# Patient Record
Sex: FEMALE | Race: WHITE | Employment: OTHER | URBAN - METROPOLITAN AREA
[De-identification: names, ages, dates, MRNs, and addresses within clinical notes are randomized per-mention and may not be internally consistent; named-entity substitution may affect disease eponyms.]

---

## 2017-05-15 ENCOUNTER — HOSPITAL ENCOUNTER (INPATIENT)
Age: 82
LOS: 2 days | Discharge: HOME | DRG: 244 | End: 2017-05-17
Attending: EMERGENCY MEDICINE | Admitting: INTERNAL MEDICINE
Payer: MEDICARE

## 2017-05-15 ENCOUNTER — OFFICE VISIT (OUTPATIENT)
Dept: FAMILY MEDICINE CLINIC | Age: 82
End: 2017-05-15

## 2017-05-15 VITALS
WEIGHT: 123 LBS | DIASTOLIC BLOOD PRESSURE: 64 MMHG | BODY MASS INDEX: 22.63 KG/M2 | HEIGHT: 62 IN | SYSTOLIC BLOOD PRESSURE: 168 MMHG | TEMPERATURE: 97.7 F | OXYGEN SATURATION: 98 % | RESPIRATION RATE: 16 BRPM | HEART RATE: 46 BPM

## 2017-05-15 DIAGNOSIS — I44.2 COMPLETE HEART BLOCK (HCC): Primary | ICD-10-CM

## 2017-05-15 DIAGNOSIS — I44.2 COMPLETE HEART BLOCK BY ELECTROCARDIOGRAM (HCC): ICD-10-CM

## 2017-05-15 DIAGNOSIS — R07.89 OTHER CHEST PAIN: Primary | ICD-10-CM

## 2017-05-15 DIAGNOSIS — I10 ESSENTIAL HYPERTENSION: ICD-10-CM

## 2017-05-15 PROBLEM — I44.30 ATRIOVENTRICULAR BLOCK: Status: ACTIVE | Noted: 2017-05-15

## 2017-05-15 LAB — TSH SERPL DL<=0.05 MIU/L-ACNC: 6.96 UIU/ML (ref 0.36–3.74)

## 2017-05-15 PROCEDURE — 36415 COLL VENOUS BLD VENIPUNCTURE: CPT | Performed by: INTERNAL MEDICINE

## 2017-05-15 PROCEDURE — 65660000000 HC RM CCU STEPDOWN

## 2017-05-15 PROCEDURE — 99283 EMERGENCY DEPT VISIT LOW MDM: CPT

## 2017-05-15 PROCEDURE — 84443 ASSAY THYROID STIM HORMONE: CPT | Performed by: INTERNAL MEDICINE

## 2017-05-15 PROCEDURE — 74011250637 HC RX REV CODE- 250/637: Performed by: EMERGENCY MEDICINE

## 2017-05-15 PROCEDURE — 74011250636 HC RX REV CODE- 250/636: Performed by: EMERGENCY MEDICINE

## 2017-05-15 PROCEDURE — 93005 ELECTROCARDIOGRAM TRACING: CPT

## 2017-05-15 PROCEDURE — 74011250637 HC RX REV CODE- 250/637: Performed by: INTERNAL MEDICINE

## 2017-05-15 RX ORDER — ONDANSETRON 2 MG/ML
4 INJECTION INTRAMUSCULAR; INTRAVENOUS
Status: DISCONTINUED | OUTPATIENT
Start: 2017-05-15 | End: 2017-05-17 | Stop reason: HOSPADM

## 2017-05-15 RX ORDER — AMLODIPINE BESYLATE 5 MG/1
2.5 TABLET ORAL DAILY
Status: DISCONTINUED | OUTPATIENT
Start: 2017-05-15 | End: 2017-05-15

## 2017-05-15 RX ORDER — HYDROCODONE BITARTRATE AND ACETAMINOPHEN 5; 325 MG/1; MG/1
1 TABLET ORAL
Status: DISCONTINUED | OUTPATIENT
Start: 2017-05-15 | End: 2017-05-17 | Stop reason: HOSPADM

## 2017-05-15 RX ORDER — ACETAMINOPHEN 325 MG/1
650 TABLET ORAL
Status: DISCONTINUED | OUTPATIENT
Start: 2017-05-15 | End: 2017-05-17 | Stop reason: HOSPADM

## 2017-05-15 RX ORDER — DOCUSATE SODIUM 100 MG/1
100 CAPSULE, LIQUID FILLED ORAL 2 TIMES DAILY
Status: DISCONTINUED | OUTPATIENT
Start: 2017-05-15 | End: 2017-05-17 | Stop reason: HOSPADM

## 2017-05-15 RX ORDER — HEPARIN SODIUM 5000 [USP'U]/ML
5000 INJECTION, SOLUTION INTRAVENOUS; SUBCUTANEOUS EVERY 8 HOURS
Status: DISCONTINUED | OUTPATIENT
Start: 2017-05-16 | End: 2017-05-17 | Stop reason: HOSPADM

## 2017-05-15 RX ORDER — HYDROCHLOROTHIAZIDE 25 MG/1
25 TABLET ORAL DAILY
Status: DISCONTINUED | OUTPATIENT
Start: 2017-05-16 | End: 2017-05-17 | Stop reason: HOSPADM

## 2017-05-15 RX ORDER — FERROUS SULFATE, DRIED 160(50) MG
1 TABLET, EXTENDED RELEASE ORAL DAILY
Status: DISCONTINUED | OUTPATIENT
Start: 2017-05-16 | End: 2017-05-17 | Stop reason: HOSPADM

## 2017-05-15 RX ORDER — SODIUM CHLORIDE 0.9 % (FLUSH) 0.9 %
5-10 SYRINGE (ML) INJECTION AS NEEDED
Status: DISCONTINUED | OUTPATIENT
Start: 2017-05-15 | End: 2017-05-17 | Stop reason: HOSPADM

## 2017-05-15 RX ORDER — NALOXONE HYDROCHLORIDE 0.4 MG/ML
0.4 INJECTION, SOLUTION INTRAMUSCULAR; INTRAVENOUS; SUBCUTANEOUS AS NEEDED
Status: DISCONTINUED | OUTPATIENT
Start: 2017-05-15 | End: 2017-05-17 | Stop reason: HOSPADM

## 2017-05-15 RX ORDER — MORPHINE SULFATE 4 MG/ML
4 INJECTION, SOLUTION INTRAMUSCULAR; INTRAVENOUS
Status: DISCONTINUED | OUTPATIENT
Start: 2017-05-15 | End: 2017-05-17 | Stop reason: HOSPADM

## 2017-05-15 RX ORDER — CITALOPRAM 20 MG/1
20 TABLET, FILM COATED ORAL DAILY
Status: DISCONTINUED | OUTPATIENT
Start: 2017-05-16 | End: 2017-05-17 | Stop reason: HOSPADM

## 2017-05-15 RX ORDER — AMLODIPINE BESYLATE 5 MG/1
5 TABLET ORAL DAILY
Status: DISCONTINUED | OUTPATIENT
Start: 2017-05-15 | End: 2017-05-16

## 2017-05-15 RX ORDER — DIAZEPAM 5 MG/1
2.5 TABLET ORAL
Status: COMPLETED | OUTPATIENT
Start: 2017-05-15 | End: 2017-05-15

## 2017-05-15 RX ORDER — SODIUM CHLORIDE 0.9 % (FLUSH) 0.9 %
5-10 SYRINGE (ML) INJECTION EVERY 8 HOURS
Status: DISCONTINUED | OUTPATIENT
Start: 2017-05-15 | End: 2017-05-17 | Stop reason: HOSPADM

## 2017-05-15 RX ORDER — LANOLIN ALCOHOL/MO/W.PET/CERES
500 CREAM (GRAM) TOPICAL DAILY
Status: DISCONTINUED | OUTPATIENT
Start: 2017-05-16 | End: 2017-05-17 | Stop reason: HOSPADM

## 2017-05-15 RX ADMIN — DOCUSATE SODIUM 100 MG: 100 CAPSULE, LIQUID FILLED ORAL at 21:52

## 2017-05-15 RX ADMIN — AMLODIPINE BESYLATE 5 MG: 5 TABLET ORAL at 21:52

## 2017-05-15 RX ADMIN — DIAZEPAM 2.5 MG: 5 TABLET ORAL at 23:06

## 2017-05-15 RX ADMIN — SODIUM CHLORIDE 500 ML: 900 INJECTION, SOLUTION INTRAVENOUS at 22:48

## 2017-05-15 RX ADMIN — Medication 10 ML: at 21:53

## 2017-05-15 NOTE — PROGRESS NOTES
HISTORY OF PRESENT ILLNESS  Zana Wheat is a 80 y.o. female. Chief Complaint   Patient presents with    Pain Upper Quadrant     Left, sharp, last nite, nitro (2) helped       HPI  Last night real sharp pain in left chest, then center and radiating to back  Very sharp  Lasted all night  Still now, but shorter, very quick now  Sitting talking or walking  Nothing help  Tried 2 ASA 81 mg but not helping  Then tried Nitro quick twice one at a time and it may have helped a little    -183/ 50-69  Pulse 40-42    Review of Systems   Constitutional: Negative for diaphoresis. Respiratory: Positive for sputum production (little). Cardiovascular: Positive for chest pain and leg swelling (right ankle). Negative for palpitations. Gastrointestinal: Positive for constipation. Negative for abdominal pain, blood in stool, diarrhea, heartburn, melena, nausea and vomiting. Genitourinary: Negative. Musculoskeletal: Negative for joint pain and myalgias. Neurological: Positive for headaches. Negative for dizziness and weakness. Past Medical History:   Diagnosis Date    Hypertension     MVP (mitral valve prolapse)      Current Outpatient Prescriptions   Medication Sig Dispense Refill    diltiazem (TIAZAC) 300 mg SR capsule Take 300 mg by mouth daily.  citalopram (CELEXA) 20 mg tablet Take  by mouth as needed.  calcium-cholecalciferol, D3, (CALTRATE 600+D) tablet Take 1 Tab by mouth daily.  cholecalciferol, vitamin D3, (VITAMIN D3) 2,000 unit tab Take  by mouth.  cyanocobalamin (VITAMIN B-12) 500 mcg tablet Take 500 mcg by mouth daily.  vitamin e (AQUA GEMS) 200 unit capsule Take  by mouth daily.  hydrochlorothiazide (HYDRODIURIL) 25 mg tablet Take 25 mg by mouth daily.        Allergies   Allergen Reactions    Verapamil Unknown (comments)     Visit Vitals    /64 (BP 1 Location: Left arm, BP Patient Position: Sitting)    Pulse (!) 46    Temp 97.7 °F (36.5 °C) (Temporal)    Resp 16    Ht 5' 2\" (1.575 m)    Wt 123 lb (55.8 kg)    SpO2 98%    BMI 22.5 kg/m2       Physical Exam   Constitutional: She is oriented to person, place, and time. She appears well-developed and well-nourished. No distress. HENT:   Head: Normocephalic and atraumatic. Right Ear: External ear normal.   Left Ear: External ear normal.   Nose: Nose normal.   Mouth/Throat: Oropharynx is clear and moist. No oropharyngeal exudate. Eyes: Conjunctivae and EOM are normal. Pupils are equal, round, and reactive to light. Neck: No thyromegaly present. Cardiovascular: Regular rhythm, normal heart sounds and intact distal pulses. bradycardia   Pulmonary/Chest: Effort normal and breath sounds normal.   Abdominal: Soft. She exhibits no distension and no mass. There is no tenderness. Musculoskeletal: She exhibits edema (trace bilat). Neurological: She is alert and oriented to person, place, and time. Skin: Skin is warm and dry. Psychiatric: She has a normal mood and affect. Nursing note and vitals reviewed. EKG with complete heart block    ASSESSMENT and PLAN    ICD-10-CM ICD-9-CM    1. Other chest pain R07.89 786.59 ECHO COMPLETE STUDY      AMB POC EKG ROUTINE W/ 12 LEADS, INTER & REP      CANCELED: CBC WITH AUTOMATED DIFF      CANCELED: XR CHEST PA LAT   2. Complete heart block by electrocardiogram (HCC) I44.2 426.0    3.  Essential hypertension I10 401.9 LIPID PANEL WITH LDL/HDL RATIO      IA HANDLG&/OR CONVEY OF SPEC FOR TR OFFICE TO LAB      IA COLLECTION VENOUS BLOOD,VENIPUNCTURE      CANCELED: METABOLIC PANEL, COMPREHENSIVE   discussed pt with Dr Radha Mooney  And pt sent to ER at [facility] for further work up and treatment, ER notified,  With  in private vehicle and stressed to go directly without delay

## 2017-05-15 NOTE — IP AVS SNAPSHOT
[address] [facility] 
[phone] Patient: Gracie Garner MRN: [id number] WQU:[id number] You are allergic to the following Allergen Reactions Verapamil Unknown (comments) Recent Documentation Height Weight BMI OB Status Smoking Status 1.6 m 56.6 kg 22.1 kg/m2 Postmenopausal Never Smoker Emergency Contacts Name Discharge Info Relation Home Work Mobile Cristian Casas  Spouse [3] [phone] About your hospitalization You were admitted on:  May 15, 2017 You last received care in the:  [facility] You were discharged on:  May 17, 2017 Unit phone number:  [phone] Why you were hospitalized Your primary diagnosis was:  Not on File Your diagnoses also included:  Atrioventricular Block Providers Seen During Your Hospitalizations Provider Role Specialty Primary office phone Pepe Giraldo DO Attending Provider Emergency Medicine [phone] Niecy Membreno MD Attending Provider Cardiology [phone] Your Primary Care Physician (PCP) Primary Care Physician Office Phone Office Fax [facility] [phone] Follow-up Information Follow up With Details Comments Contact Info Fredy Flores MD   [address] [address] 
[phone] Current Discharge Medication List  
  
START taking these medications Dose & Instructions Dispensing Information Comments Morning Noon Evening Bedtime  
 dilTIAZem  mg ER capsule Commonly known as:  CARDIZEM CD Replaces:  dilTIAZem 300 mg SR capsule Your last dose was: Your next dose is:    
   
   
 Dose:  300 mg Take 1 Cap by mouth daily. Quantity:  30 Cap Refills:  0 CONTINUE these medications which have NOT CHANGED Dose & Instructions Dispensing Information Comments Morning Noon Evening Bedtime  
 calcium-cholecalciferol (D3) tablet Commonly known as:  CALTRATE 600+D Your last dose was: Your next dose is:    
   
   
 Dose:  1 Tab Take 1 Tab by mouth daily. Refills:  0  
     
   
   
   
  
 citalopram 20 mg tablet Commonly known as:  Mertha Slim Your last dose was: Your next dose is: Take  by mouth as needed. Refills:  0  
     
   
   
   
  
 hydroCHLOROthiazide 25 mg tablet Commonly known as:  HYDRODIURIL Your last dose was: Your next dose is:    
   
   
 Dose:  25 mg Take 25 mg by mouth daily. Refills:  0  
     
   
   
   
  
 VITAMIN B-12 500 mcg tablet Generic drug:  cyanocobalamin Your last dose was: Your next dose is:    
   
   
 Dose:  500 mcg Take 500 mcg by mouth daily. Refills:  0  
     
   
   
   
  
 VITAMIN D3 2,000 unit Tab Generic drug:  cholecalciferol (vitamin D3) Your last dose was: Your next dose is: Take  by mouth. Refills:  0  
     
   
   
   
  
 vitamin e 200 unit capsule Commonly known as:  [address] Your last dose was: Your next dose is: Take  by mouth daily. Refills:  0 STOP taking these medications   
 dilTIAZem 300 mg SR capsule Commonly known as:  [facility] Replaced by:  dilTIAZem  mg ER capsule Where to Get Your Medications Information on where to get these meds will be given to you by the nurse or doctor. ! Ask your nurse or doctor about these medications  
  dilTIAZem  mg ER capsule Discharge Instructions DISCHARGE INSTRUCTIONS FOR PATIENTS WITH PACEMAKERS You had a permanent pacemaker inserted on 5/16 with Dr. Katia Ramirez due to a slow heart rate problem called complete heart block. The diltiazem was discontinued on admission, so it had to be re-prescribed on discharge. This is NOT a new medication for you. Take it indefinitely moving forward. Won't switch to another blood pressure medication since you seemed to tolerate this well and no longer concerned about slowing the heart rate with the pacemaker in place. 1. Remember to call for an appointment in 2-4 weeks [phone] to check healing and implant programming with Dr. Tamiko Reyes nurse. 2. Medic Alert Bracelets are available from your pharmacist to wear at all times if you choose to wear one. 3. Carry your ID card for pacemaker with you at all times. This card will be given to you in the hospital or mailed to you. 4. The pacemaker will bulge slightly under your skin. The bulge will decrease in size over the next few weeks. Please notify the doctor's office if you notice any of the following around your site: A.  A bruise that does not go away. B.  Soreness or yellow, green, or brown drainage from the site. C. Any swelling from the site. D. If you have a fever of 100 degrees or higher that lasts for a few days. INCISION CARE 1.  Leave skin glue over your site until it starts to fall off, usually in a few weeks. 2.  You may shower after 3 days as long as your incision isnt submerged or directly sprayed upon until well healed. 3.  For comfort, wear loose fitting clothing. 4.  Report any signs of infection, fever, pain, swelling, redness, oozing, or heat at site especially if these symptoms increase after the first 3 to 4 days. ACTIVITY PRECAUTIONS 1. Avoid rough contact with the implant site. 2. No driving for 14 days. 3. Avoid lifting your arm over your head, carrying anything on the affected side, or lifting over 10 pounds for 30 days. For the first 2 days only bend your arm at the elbow. 4. Any extreme activity such as golf, weight lifting or exercise biking should be restricted for 60 days. 5. Do not carry objects by holding them against your implant site. 6.  No shooting rifles or any type of gun with the affected shoulder permanently. SPECIAL PRECAUTIONS 1. You should avoid all strong magnetic fields, such as arc welding, large transformers, large motors. 2.  You may not have an MRI which uses a strong magnet to take pictures. 3.  Treatments or surgery that requires diathermy or electrocautery should be discussed with your doctor before scheduled. 4. Avoid radio frequency transmitters, including radar. 5. Advise dentist or other medical personnel you see that you have a pacemaker. 6.  Cell phones and microwave oven use is okay. 7.  If you plan to move or take a trip to a new area, the doctor's office will give you a name of a doctor to contact for any problems. ANTIBIOTIC THERAPY During the first 8 weeks after your pacemaker insertion, you may need antibiotics before any dental work or certain tests or operations. Let the dentist or doctor who is caring for you know that you have had an implanted device. Discharge Orders None [employer] Announcement We are excited to announce that we are making your provider's discharge notes available to you in [employer]. You will see these notes when they are completed and signed by the physician that discharged you from your recent hospital stay. If you have any questions or concerns about any information you see in [employer], please call the Health Information Department where you were seen or reach out to your Primary Care Provider for more information about your plan of care. Introducing [facility]! [employer] introduces [employer] patient portal. Now you can access parts of your medical record, email your doctor's office, and request medication refills online. 1. In your internet browser, go to [email or web address] 2. Click on the First Time User? Click Here link in the Sign In box.  You will see the New Member Sign Up page. 3. Enter your [employer] Access Code exactly as it appears below. You will not need to use this code after youve completed the sign-up process. If you do not sign up before the expiration date, you must request a new code. · [employer] Access Code: [email or web address] Expires: 8/15/2017  5:57 PM 
 
4. Enter the last four digits of your Social Security Number (xxxx) and Date of Birth (mm/dd/yyyy) as indicated and click Submit. You will be taken to the next sign-up page. 5. Create a [employer] ID. This will be your [employer] login ID and cannot be changed, so think of one that is secure and easy to remember. 6. Create a [employer] password. You can change your password at any time. 7. Enter your Password Reset Question and Answer. This can be used at a later time if you forget your password. 8. Enter your e-mail address. You will receive e-mail notification when new information is available in [address] 9. Click Sign Up. You can now view and download portions of your medical record. 10. Click the Download Summary menu link to download a portable copy of your medical information. If you have questions, please visit the Frequently Asked Questions section of the [employer] website. Remember, [employer] is NOT to be used for urgent needs. For medical emergencies, dial 911. Now available from your iPhone and Android! General Information Please provide this summary of care documentation to your next provider. Patient Signature:  ____________________________________________________________ Date:  ____________________________________________________________  
  
Maggie Cummings Provider Signature:  ____________________________________________________________ Date:  ____________________________________________________________

## 2017-05-15 NOTE — IP AVS SNAPSHOT
Current Discharge Medication List  
  
START taking these medications Dose & Instructions Dispensing Information Comments Morning Noon Evening Bedtime  
 dilTIAZem  mg ER capsule Commonly known as:  CARDIZEM CD Replaces:  dilTIAZem 300 mg SR capsule Your last dose was: Your next dose is:    
   
   
 Dose:  300 mg Take 1 Cap by mouth daily. Quantity:  30 Cap Refills:  0 CONTINUE these medications which have NOT CHANGED Dose & Instructions Dispensing Information Comments Morning Noon Evening Bedtime  
 calcium-cholecalciferol (D3) tablet Commonly known as:  CALTRATE 600+D Your last dose was: Your next dose is:    
   
   
 Dose:  1 Tab Take 1 Tab by mouth daily. Refills:  0  
     
   
   
   
  
 citalopram 20 mg tablet Commonly known as:  Sallye Force Your last dose was: Your next dose is: Take  by mouth as needed. Refills:  0  
     
   
   
   
  
 hydroCHLOROthiazide 25 mg tablet Commonly known as:  HYDRODIURIL Your last dose was: Your next dose is:    
   
   
 Dose:  25 mg Take 25 mg by mouth daily. Refills:  0  
     
   
   
   
  
 VITAMIN B-12 500 mcg tablet Generic drug:  cyanocobalamin Your last dose was: Your next dose is:    
   
   
 Dose:  500 mcg Take 500 mcg by mouth daily. Refills:  0  
     
   
   
   
  
 VITAMIN D3 2,000 unit Tab Generic drug:  cholecalciferol (vitamin D3) Your last dose was: Your next dose is: Take  by mouth. Refills:  0  
     
   
   
   
  
 vitamin e 200 unit capsule Commonly known as:  [address] Your last dose was: Your next dose is: Take  by mouth daily. Refills:  0 STOP taking these medications   
 dilTIAZem 300 mg SR capsule Commonly known as:  [facility] Replaced by:  dilTIAZem  mg ER capsule Where to Get Your Medications Information on where to get these meds will be given to you by the nurse or doctor. ! Ask your nurse or doctor about these medications  
  dilTIAZem  mg ER capsule

## 2017-05-16 ENCOUNTER — APPOINTMENT (OUTPATIENT)
Dept: GENERAL RADIOLOGY | Age: 82
DRG: 244 | End: 2017-05-16
Attending: INTERNAL MEDICINE
Payer: MEDICARE

## 2017-05-16 ENCOUNTER — TELEPHONE (OUTPATIENT)
Dept: ENDOCRINOLOGY | Age: 82
End: 2017-05-16

## 2017-05-16 LAB
ANION GAP BLD CALC-SCNC: 9 MMOL/L (ref 5–15)
ATRIAL RATE: 52 BPM
BASOPHILS # BLD AUTO: 0.1 K/UL (ref 0–0.1)
BASOPHILS # BLD: 1 % (ref 0–1)
BUN SERPL-MCNC: 17 MG/DL (ref 6–20)
BUN/CREAT SERPL: 19 (ref 12–20)
CALCIUM SERPL-MCNC: 9.1 MG/DL (ref 8.5–10.1)
CALCULATED P AXIS, ECG09: 77 DEGREES
CALCULATED R AXIS, ECG10: -35 DEGREES
CALCULATED T AXIS, ECG11: 57 DEGREES
CHLORIDE SERPL-SCNC: 108 MMOL/L (ref 97–108)
CO2 SERPL-SCNC: 25 MMOL/L (ref 21–32)
CREAT SERPL-MCNC: 0.89 MG/DL (ref 0.55–1.02)
DIAGNOSIS, 93000: NORMAL
EOSINOPHIL # BLD: 0.1 K/UL (ref 0–0.4)
EOSINOPHIL NFR BLD: 2 % (ref 0–7)
ERYTHROCYTE [DISTWIDTH] IN BLOOD BY AUTOMATED COUNT: 13.2 % (ref 11.5–14.5)
GLUCOSE SERPL-MCNC: 82 MG/DL (ref 65–100)
HCT VFR BLD AUTO: 40.1 % (ref 35–47)
HGB BLD-MCNC: 13.5 G/DL (ref 11.5–16)
LYMPHOCYTES # BLD AUTO: 44 % (ref 12–49)
LYMPHOCYTES # BLD: 3 K/UL (ref 0.8–3.5)
MCH RBC QN AUTO: 30.3 PG (ref 26–34)
MCHC RBC AUTO-ENTMCNC: 33.7 G/DL (ref 30–36.5)
MCV RBC AUTO: 90.1 FL (ref 80–99)
MONOCYTES # BLD: 0.7 K/UL (ref 0–1)
MONOCYTES NFR BLD AUTO: 10 % (ref 5–13)
NEUTS SEG # BLD: 3 K/UL (ref 1.8–8)
NEUTS SEG NFR BLD AUTO: 43 % (ref 32–75)
P-R INTERVAL, ECG05: 256 MS
PLATELET # BLD AUTO: 213 K/UL (ref 150–400)
POTASSIUM SERPL-SCNC: 3.6 MMOL/L (ref 3.5–5.1)
Q-T INTERVAL, ECG07: 494 MS
QRS DURATION, ECG06: 82 MS
QTC CALCULATION (BEZET), ECG08: 459 MS
RBC # BLD AUTO: 4.45 M/UL (ref 3.8–5.2)
SODIUM SERPL-SCNC: 142 MMOL/L (ref 136–145)
T4 FREE SERPL-MCNC: 1.2 NG/DL (ref 0.8–1.5)
VENTRICULAR RATE, ECG03: 52 BPM
WBC # BLD AUTO: 6.8 K/UL (ref 3.6–11)

## 2017-05-16 PROCEDURE — C1892 INTRO/SHEATH,FIXED,PEEL-AWAY: HCPCS

## 2017-05-16 PROCEDURE — 36415 COLL VENOUS BLD VENIPUNCTURE: CPT | Performed by: INTERNAL MEDICINE

## 2017-05-16 PROCEDURE — 93306 TTE W/DOPPLER COMPLETE: CPT

## 2017-05-16 PROCEDURE — 77030018836 HC SOL IRR NACL ICUM -A

## 2017-05-16 PROCEDURE — 85025 COMPLETE CBC W/AUTO DIFF WBC: CPT | Performed by: INTERNAL MEDICINE

## 2017-05-16 PROCEDURE — 80048 BASIC METABOLIC PNL TOTAL CA: CPT | Performed by: INTERNAL MEDICINE

## 2017-05-16 PROCEDURE — 71010 XR CHEST PORT: CPT

## 2017-05-16 PROCEDURE — C1785 PMKR, DUAL, RATE-RESP: HCPCS

## 2017-05-16 PROCEDURE — 77030011640 HC PAD GRND REM COVD -A

## 2017-05-16 PROCEDURE — 0JH606Z INSERTION OF PACEMAKER, DUAL CHAMBER INTO CHEST SUBCUTANEOUS TISSUE AND FASCIA, OPEN APPROACH: ICD-10-PCS | Performed by: INTERNAL MEDICINE

## 2017-05-16 PROCEDURE — 74011250636 HC RX REV CODE- 250/636: Performed by: INTERNAL MEDICINE

## 2017-05-16 PROCEDURE — 74011000250 HC RX REV CODE- 250

## 2017-05-16 PROCEDURE — 33208 INSRT HEART PM ATRIAL & VENT: CPT

## 2017-05-16 PROCEDURE — 02H63JZ INSERTION OF PACEMAKER LEAD INTO RIGHT ATRIUM, PERCUTANEOUS APPROACH: ICD-10-PCS | Performed by: INTERNAL MEDICINE

## 2017-05-16 PROCEDURE — 02HK3JZ INSERTION OF PACEMAKER LEAD INTO RIGHT VENTRICLE, PERCUTANEOUS APPROACH: ICD-10-PCS | Performed by: INTERNAL MEDICINE

## 2017-05-16 PROCEDURE — 77030031139 HC SUT VCRL2 J&J -A

## 2017-05-16 PROCEDURE — C1898 LEAD, PMKR, OTHER THAN TRANS: HCPCS

## 2017-05-16 PROCEDURE — 84439 ASSAY OF FREE THYROXINE: CPT | Performed by: INTERNAL MEDICINE

## 2017-05-16 PROCEDURE — L3670 SO ACRO/CLAV CAN WEB PRE OTS: HCPCS

## 2017-05-16 PROCEDURE — C1894 INTRO/SHEATH, NON-LASER: HCPCS

## 2017-05-16 PROCEDURE — 77030002996 HC SUT SLK J&J -A

## 2017-05-16 PROCEDURE — 74011250636 HC RX REV CODE- 250/636

## 2017-05-16 PROCEDURE — 77030018729 HC ELECTRD DEFIB PAD CARD -B

## 2017-05-16 PROCEDURE — 65660000000 HC RM CCU STEPDOWN

## 2017-05-16 RX ORDER — DILTIAZEM HYDROCHLORIDE 300 MG/1
300 CAPSULE, COATED, EXTENDED RELEASE ORAL DAILY
Qty: 30 CAP | Refills: 0 | Status: SHIPPED | OUTPATIENT
Start: 2017-05-16 | End: 2017-09-12 | Stop reason: SDUPTHER

## 2017-05-16 RX ORDER — HEPARIN SODIUM 200 [USP'U]/100ML
500 INJECTION, SOLUTION INTRAVENOUS ONCE
Status: COMPLETED | OUTPATIENT
Start: 2017-05-16 | End: 2017-05-16

## 2017-05-16 RX ORDER — SODIUM CHLORIDE 0.9 % (FLUSH) 0.9 %
5-10 SYRINGE (ML) INJECTION EVERY 8 HOURS
Status: DISCONTINUED | OUTPATIENT
Start: 2017-05-16 | End: 2017-05-17 | Stop reason: HOSPADM

## 2017-05-16 RX ORDER — LEVOTHYROXINE SODIUM 25 UG/1
25 TABLET ORAL
Qty: 30 TAB | Refills: 0 | Status: SHIPPED | OUTPATIENT
Start: 2017-05-16 | End: 2017-05-17

## 2017-05-16 RX ORDER — SODIUM CHLORIDE 900 MG/100ML
INJECTION INTRAVENOUS
Status: DISCONTINUED
Start: 2017-05-16 | End: 2017-05-16 | Stop reason: ALTCHOICE

## 2017-05-16 RX ORDER — SODIUM CHLORIDE 9 MG/ML
100 INJECTION, SOLUTION INTRAVENOUS CONTINUOUS
Status: DISCONTINUED | OUTPATIENT
Start: 2017-05-16 | End: 2017-05-16

## 2017-05-16 RX ORDER — LIDOCAINE HYDROCHLORIDE AND EPINEPHRINE 10; 10 MG/ML; UG/ML
INJECTION, SOLUTION INFILTRATION; PERINEURAL
Status: COMPLETED
Start: 2017-05-16 | End: 2017-05-16

## 2017-05-16 RX ORDER — VANCOMYCIN HYDROCHLORIDE 1 G/20ML
INJECTION, POWDER, LYOPHILIZED, FOR SOLUTION INTRAVENOUS
Status: COMPLETED
Start: 2017-05-16 | End: 2017-05-16

## 2017-05-16 RX ORDER — FENTANYL CITRATE 50 UG/ML
12.5-5 INJECTION, SOLUTION INTRAMUSCULAR; INTRAVENOUS
Status: DISCONTINUED | OUTPATIENT
Start: 2017-05-16 | End: 2017-05-16 | Stop reason: ALTCHOICE

## 2017-05-16 RX ORDER — BACITRACIN 50000 [IU]/1
50000 INJECTION, POWDER, FOR SOLUTION INTRAMUSCULAR ONCE
Status: COMPLETED | OUTPATIENT
Start: 2017-05-16 | End: 2017-05-16

## 2017-05-16 RX ORDER — MIDAZOLAM HYDROCHLORIDE 1 MG/ML
INJECTION, SOLUTION INTRAMUSCULAR; INTRAVENOUS
Status: COMPLETED
Start: 2017-05-16 | End: 2017-05-16

## 2017-05-16 RX ORDER — MIDAZOLAM HYDROCHLORIDE 1 MG/ML
1-5 INJECTION, SOLUTION INTRAMUSCULAR; INTRAVENOUS
Status: DISCONTINUED | OUTPATIENT
Start: 2017-05-16 | End: 2017-05-16 | Stop reason: ALTCHOICE

## 2017-05-16 RX ORDER — BACITRACIN 50000 [IU]/1
INJECTION, POWDER, FOR SOLUTION INTRAMUSCULAR
Status: COMPLETED
Start: 2017-05-16 | End: 2017-05-16

## 2017-05-16 RX ORDER — HEPARIN SODIUM 200 [USP'U]/100ML
INJECTION, SOLUTION INTRAVENOUS
Status: COMPLETED
Start: 2017-05-16 | End: 2017-05-16

## 2017-05-16 RX ORDER — LIDOCAINE HYDROCHLORIDE AND EPINEPHRINE 10; 10 MG/ML; UG/ML
1-20 INJECTION, SOLUTION INFILTRATION; PERINEURAL
Status: DISCONTINUED | OUTPATIENT
Start: 2017-05-16 | End: 2017-05-16 | Stop reason: ALTCHOICE

## 2017-05-16 RX ORDER — FENTANYL CITRATE 50 UG/ML
INJECTION, SOLUTION INTRAMUSCULAR; INTRAVENOUS
Status: COMPLETED
Start: 2017-05-16 | End: 2017-05-16

## 2017-05-16 RX ORDER — SODIUM CHLORIDE 0.9 % (FLUSH) 0.9 %
5-10 SYRINGE (ML) INJECTION AS NEEDED
Status: DISCONTINUED | OUTPATIENT
Start: 2017-05-16 | End: 2017-05-17 | Stop reason: HOSPADM

## 2017-05-16 RX ADMIN — Medication 10 ML: at 21:55

## 2017-05-16 RX ADMIN — MIDAZOLAM HYDROCHLORIDE 2 MG: 1 INJECTION, SOLUTION INTRAMUSCULAR; INTRAVENOUS at 18:30

## 2017-05-16 RX ADMIN — Medication 10 ML: at 14:39

## 2017-05-16 RX ADMIN — FENTANYL CITRATE 50 MCG: 50 INJECTION, SOLUTION INTRAMUSCULAR; INTRAVENOUS at 18:45

## 2017-05-16 RX ADMIN — MIDAZOLAM HYDROCHLORIDE 2 MG: 1 INJECTION, SOLUTION INTRAMUSCULAR; INTRAVENOUS at 18:15

## 2017-05-16 RX ADMIN — BACITRACIN 50000 UNITS: 5000 INJECTION, POWDER, FOR SOLUTION INTRAMUSCULAR at 19:01

## 2017-05-16 RX ADMIN — HEPARIN SODIUM 5000 UNITS: 5000 INJECTION, SOLUTION INTRAVENOUS; SUBCUTANEOUS at 06:33

## 2017-05-16 RX ADMIN — FENTANYL CITRATE 50 MCG: 50 INJECTION, SOLUTION INTRAMUSCULAR; INTRAVENOUS at 18:15

## 2017-05-16 RX ADMIN — LIDOCAINE HYDROCHLORIDE AND EPINEPHRINE 20 ML: 10; 10 INJECTION, SOLUTION INFILTRATION; PERINEURAL at 18:41

## 2017-05-16 RX ADMIN — BACITRACIN 50000 UNITS: 50000 INJECTION, POWDER, FOR SOLUTION INTRAMUSCULAR at 19:01

## 2017-05-16 RX ADMIN — Medication 10 ML: at 21:56

## 2017-05-16 RX ADMIN — LIDOCAINE HYDROCHLORIDE,EPINEPHRINE BITARTRATE 20 ML: 10; .01 INJECTION, SOLUTION INFILTRATION; PERINEURAL at 18:41

## 2017-05-16 RX ADMIN — VANCOMYCIN HYDROCHLORIDE 1000 MG: 1 INJECTION, POWDER, LYOPHILIZED, FOR SOLUTION INTRAVENOUS at 18:20

## 2017-05-16 RX ADMIN — HEPARIN SODIUM 5000 UNITS: 5000 INJECTION, SOLUTION INTRAVENOUS; SUBCUTANEOUS at 21:55

## 2017-05-16 RX ADMIN — HEPARIN SODIUM 1000 UNITS: 200 INJECTION, SOLUTION INTRAVENOUS at 18:15

## 2017-05-16 RX ADMIN — SODIUM CHLORIDE 100 ML/HR: 900 INJECTION, SOLUTION INTRAVENOUS at 18:00

## 2017-05-16 RX ADMIN — MIDAZOLAM HYDROCHLORIDE 1 MG: 1 INJECTION, SOLUTION INTRAMUSCULAR; INTRAVENOUS at 18:44

## 2017-05-16 RX ADMIN — MIDAZOLAM HYDROCHLORIDE 2 MG: 1 INJECTION INTRAMUSCULAR; INTRAVENOUS at 18:15

## 2017-05-16 RX ADMIN — Medication 10 ML: at 06:34

## 2017-05-16 RX ADMIN — Medication 10 ML: at 09:13

## 2017-05-16 NOTE — PROGRESS NOTES
Progress Note    NAME: Jesse Speaker   :  1935   MRN:  [id number]     Date/Time:  2017 9:17 PM    Patient PCP: Darline Hanley MD  ________________________________________________________________________     Assessment:     1. 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 She was in 2:1 AV block yesterday, so she actually has progressed in process of diltiazem washout. 2. History of paroxysmal supraventricular tachycardia, on diltiazem, previously on verapamil but didn't tolerate. 3. Hypertensive heart disease without heart failure. 4. Mitral valve prolapse. 5. Elevated TSH, likely low level hypothyroidism. 6. FULL CODE. Plan:     1. I discussed the potential benefits, risks (infection, bleeding, PTX, med reaction, etc), and alternatives to pacemaker implantation and she agrees to proceed. 2. Discontinued diltiazem (last dose 5/15) , avoid rate lowering medication. 3. Echo pending. 4. Will use norvasc in place of diltiazem. 5. Continue HCTZ, but hold this AM.  6. Consider starting thyroid supplement later in the admission. [x]        High complexity decision making was performed. Subjective:      Theron Blanco is a 80 y.o.  female who began having left sided chest pain (sharp) w/o radiation the night PTA while at rest.  She took two NTG without much relief (very old NTG) and then went to an OSH. She had no nausea or sweats. She was found to be hypertensive and in a 2:1 AVB. She was flown here by helicopter. TODAY:  She denies syncope, dizziness, palpitations, SOB at rest.  Her lest episode of sharp left-sided chest pain radiating to her back was yesterday.     Past Medical History:   Diagnosis Date    Hypertension     MVP (mitral valve prolapse)       Past Surgical History:   Procedure Laterality Date    HX COLONOSCOPY       Allergies   Allergen Reactions    Verapamil Unknown (comments)      Meds:  See below  Social History   Substance Use Topics    Smoking status: Never Smoker    Smokeless tobacco: Never Used    Alcohol use No      Family History   Problem Relation Age of Onset    No Known Problems Mother        REVIEW OF SYSTEMS:    Constitutional: negative fever, negative chills, negative weight loss  Respiratory:  negative cough, negative dyspnea  Cards:  negative for chest pain, palpitations, lower extremity edema  GI:   negative for nausea, vomiting, diarrhea, and abdominal pain  Genitourinary: negative for frequency, dysuria  Neurological:  negative for headaches, dizziness, vertigo, weakness    Objective:      Physical Exam:    Last 24hrs VS reviewed since prior progress note. Most recent are:    Visit Vitals    /48 (BP 1 Location: Left arm, BP Patient Position: At rest)    Pulse (!) 41    Temp 97.7 °F (36.5 °C)    Resp 16    Ht 5' 3\" (1.6 m)    Wt 56.6 kg (124 lb 12.5 oz)    SpO2 97%    BMI 22.1 kg/m2       Intake/Output Summary (Last 24 hours) at 05/16/17 0901  Last data filed at 05/16/17 0837   Gross per 24 hour   Intake                0 ml   Output              600 ml   Net             -600 ml        General Appearance: Well developed, well nourished, alert & oriented x 3,    no acute distress. Ears/Nose/Mouth/Throat: Pupils equal and round, Hearing grossly normal.  Neck:  Supple. JVP within normal limits. Carotids good upstrokes, with no bruit. Chest: Lungs clear to auscultation bilaterally. Symmetric air movement. Unlabored. Cardiovascular: Regular rate and rhythm, bradycardic, S1S2 normal, late systolic murmur +/- early click  Abdomen: Soft, non-tender, bowel sounds are active. No organomegaly. Extremities: No edema bilaterally. Distal Pulses +1. Skin: Warm and dry. Neuro: CN II-XII grossly intact, Strength and sensation grossly intact. []         Post-cath site without hematoma, bruit, tenderness, or thrill. Distal pulses intact.     Data:      Prior to Admission medications    Medication Sig Start Date End Date Taking? Authorizing Provider   citalopram (CELEXA) 20 mg tablet Take  by mouth as needed. Historical Provider   calcium-cholecalciferol, D3, (CALTRATE 600+D) tablet Take 1 Tab by mouth daily. Historical Provider   cholecalciferol, vitamin D3, (VITAMIN D3) 2,000 unit tab Take  by mouth. Historical Provider   cyanocobalamin (VITAMIN B-12) 500 mcg tablet Take 500 mcg by mouth daily. Historical Provider   vitamin e (AQUA GEMS) 200 unit capsule Take  by mouth daily. Historical Provider   hydrochlorothiazide (HYDRODIURIL) 25 mg tablet Take 25 mg by mouth daily. Historical Provider       Recent Results (from the past 24 hour(s))   EKG, 12 LEAD, INITIAL    Collection Time: 05/15/17  6:12 PM   Result Value Ref Range    Ventricular Rate 44 BPM    Atrial Rate 79 BPM    QRS Duration 78 ms    Q-T Interval 540 ms    QTC Calculation (Bezet) 461 ms    Calculated P Axis 76 degrees    Calculated R Axis -17 degrees    Calculated T Axis 33 degrees    Diagnosis       Sinus rhythm with complete heart block and Junctional bradycardia  Possible Anterior infarct , age undetermined  Abnormal ECG  No previous ECGs available  Confirmed by Marco Ferrell MD, --- ([id number]) on 5/16/2017 8:04:01 AM     CBC WITH AUTOMATED DIFF    Collection Time: 05/15/17  6:15 PM   Result Value Ref Range    WBC 6.8 3.6 - 11.0 K/uL    RBC 4.50 3.80 - 5.20 M/uL    HGB 13.3 11.5 - 16.0 g/dL    HCT 41.0 35.0 - 47.0 %    MCV 91.1 80.0 - 99.0 FL    MCH 29.6 26.0 - 34.0 PG    MCHC 32.4 30.0 - 36.5 g/dL    RDW 13.6 11.5 - 14.5 %    PLATELET 952 472 - 958 K/uL    NEUTROPHILS 44 32 - 75 %    LYMPHOCYTES 43 12 - 49 %    MONOCYTES 10 5 - 13 %    EOSINOPHILS 2 0 - 7 %    BASOPHILS 1 0 - 1 %    ABS. NEUTROPHILS 3.0 1.8 - 8.0 K/UL    ABS. LYMPHOCYTES 2.9 0.8 - 3.5 K/UL    ABS. MONOCYTES 0.7 0.0 - 1.0 K/UL    ABS. EOSINOPHILS 0.2 0.0 - 0.4 K/UL    ABS.  BASOPHILS 0.0 0.0 - 0.1 K/UL   TROPONIN I    Collection Time: 05/15/17  6:15 PM   Result Value Ref Range    Troponin-I, Qt. <0.04 <1.09 ng/mL   METABOLIC PANEL, COMPREHENSIVE    Collection Time: 05/15/17  6:15 PM   Result Value Ref Range    Sodium 143 136 - 145 mmol/L    Potassium 4.3 3.5 - 5.1 mmol/L    Chloride 103 97 - 108 mmol/L    CO2 26 21 - 32 mmol/L    Anion gap 14 5 - 15 mmol/L    Glucose 96 65 - 100 mg/dL    BUN 25 (H) 7.0 - 18.0 MG/DL    Creatinine 1.00 0.55 - 1.02 MG/DL    BUN/Creatinine ratio 25 (H) 12 - 20      GFR est AA >60 >60 ml/min/1.73m2    GFR est non-AA 53 (L) >60 ml/min/1.73m2    Calcium 9.4 8.5 - 10.1 MG/DL    Bilirubin, total 0.4 0.2 - 1.0 MG/DL    ALT (SGPT) 34 14 - 63 U/L    AST (SGOT) 43 (H) 15 - 37 U/L    Alk.  phosphatase 87 45 - 117 U/L    Protein, total 7.7 6.4 - 8.2 g/dL    Albumin 4.1 3.5 - 5.0 g/dL    Globulin 3.6 2.0 - 4.0 g/dL    A-G Ratio 1.1 1.1 - 2.2     CK W/ CKMB & INDEX    Collection Time: 05/15/17  6:15 PM   Result Value Ref Range    CK 70 26 - 192 U/L    CK - MB <1.0 <3.6 NG/ML    CK-MB Index Cannot be calulated 0 - 2.5     MAGNESIUM    Collection Time: 05/15/17  6:15 PM   Result Value Ref Range    Magnesium 2.4 1.6 - 2.4 mg/dL   PROTHROMBIN TIME + INR    Collection Time: 05/15/17  6:15 PM   Result Value Ref Range    INR 0.9 0.9 - 1.1      Prothrombin time 10.9 10.0 - 13.0 sec   URINALYSIS W/ RFLX MICROSCOPIC    Collection Time: 05/15/17  6:15 PM   Result Value Ref Range    Color YELLOW/STRAW      Appearance CLEAR CLEAR      Specific gravity 1.020 1.003 - 1.030      pH (UA) 7.5 5.0 - 8.0      Protein NEGATIVE  NEG mg/dL    Glucose NEGATIVE  NEG mg/dL    Ketone NEGATIVE  NEG mg/dL    Bilirubin NEGATIVE  NEG      Blood NEGATIVE  NEG      Urobilinogen 0.2 0.2 - 1.0 EU/dL    Nitrites NEGATIVE  NEG      Leukocyte Esterase SMALL (A) NEG     URINE MICROSCOPIC ONLY    Collection Time: 05/15/17  6:15 PM   Result Value Ref Range    WBC 5-10 0 - 4 /hpf    RBC 0-5 0 - 5 /hpf    Epithelial cells FEW FEW /lpf    Bacteria NEGATIVE  NEG /hpf   TSH 3RD GENERATION    Collection Time: 05/15/17  7:15 PM   Result Value Ref Range    TSH 5.89 (H) 0.34 - 4.82 uIU/mL   EKG, 12 LEAD, SUBSEQUENT    Collection Time: 05/15/17  7:31 PM   Result Value Ref Range    Ventricular Rate 42 BPM    Atrial Rate 42 BPM    P-R Interval 260 ms    QRS Duration 84 ms    Q-T Interval 606 ms    QTC Calculation (Bezet) 506 ms    Calculated P Axis 75 degrees    Calculated R Axis -46 degrees    Calculated T Axis 50 degrees    Diagnosis       Marked sinus bradycardia with 1st degree AV block  with 2nd degree AV block (Mobitz II)  Left axis deviation  Abnormal ECG  When compared with ECG of 15-MAY-2017 18:12,  note resolved complete heart block    Confirmed by Brian Mustafa MD, --- ([id number]) on 5/16/2017 8:07:22 AM     EKG, 12 LEAD, INITIAL    Collection Time: 05/15/17  8:40 PM   Result Value Ref Range    Ventricular Rate 52 BPM    Atrial Rate 52 BPM    P-R Interval 256 ms    QRS Duration 82 ms    Q-T Interval 494 ms    QTC Calculation (Bezet) 459 ms    Calculated P Axis 77 degrees    Calculated R Axis -35 degrees    Calculated T Axis 57 degrees    Diagnosis       Sinus bradycardia with 1st degree AV block with occasional premature   ventricular complexes  Possible Left atrial enlargement  Left axis deviation  Cannot rule out Inferior infarct , age undetermined  Abnormal ECG  When compared with ECG of 15-MAY-2017 19:31,  MANUAL COMPARISON REQUIRED, DATA IS UNCONFIRMED     TSH 3RD GENERATION    Collection Time: 05/15/17  9:48 PM   Result Value Ref Range    TSH 6.96 (H) 0.36 - 3.74 uIU/mL   CBC WITH AUTOMATED DIFF    Collection Time: 05/16/17  5:30 AM   Result Value Ref Range    WBC 6.8 3.6 - 11.0 K/uL    RBC 4.45 3.80 - 5.20 M/uL    HGB 13.5 11.5 - 16.0 g/dL    HCT 40.1 35.0 - 47.0 %    MCV 90.1 80.0 - 99.0 FL    MCH 30.3 26.0 - 34.0 PG    MCHC 33.7 30.0 - 36.5 g/dL    RDW 13.2 11.5 - 14.5 %    PLATELET 830 209 - 043 K/uL    NEUTROPHILS 43 32 - 75 %    LYMPHOCYTES 44 12 - 49 %    MONOCYTES 10 5 - 13 %    EOSINOPHILS 2 0 - 7 %    BASOPHILS 1 0 - 1 %    ABS. NEUTROPHILS 3.0 1.8 - 8.0 K/UL    ABS. LYMPHOCYTES 3.0 0.8 - 3.5 K/UL    ABS. MONOCYTES 0.7 0.0 - 1.0 K/UL    ABS. EOSINOPHILS 0.1 0.0 - 0.4 K/UL    ABS.  BASOPHILS 0.1 0.0 - 0.1 K/UL   METABOLIC PANEL, BASIC    Collection Time: 05/16/17  5:30 AM   Result Value Ref Range    Sodium 142 136 - 145 mmol/L    Potassium 3.6 3.5 - 5.1 mmol/L    Chloride 108 97 - 108 mmol/L    CO2 25 21 - 32 mmol/L    Anion gap 9 5 - 15 mmol/L    Glucose 82 65 - 100 mg/dL    BUN 17 6 - 20 MG/DL    Creatinine 0.89 0.55 - 1.02 MG/DL    BUN/Creatinine ratio 19 12 - 20      GFR est AA >60 >60 ml/min/1.73m2    GFR est non-AA >60 >60 ml/min/1.73m2    Calcium 9.1 8.5 - 10.1 MG/DL         Cordelia Hanley MD

## 2017-05-16 NOTE — PROGRESS NOTES
Bedside and Verbal shift change report given to Britta Hurley RN (oncoming nurse) by Radha Blackwell RN (offgoing nurse). Report included the following information SBAR, Kardex and MAR.

## 2017-05-16 NOTE — CDMP QUERY
Please clarify if this patient is being treated/managed for:    =>Could pt's sharp CP without radiation at rest with underlying AV block be considered Unstable angina POA?      =>Other Explanation of clinical findings  =>Unable to Determine (no explanation of clinical findings)    The medical record reflects the following clinical findings, treatment, and risk factors:    Risk Factors: sharp CP at rest without radiation, headache    Clinical Indicators: 2:1 AV block progressing to complete heart block with HTB, 159-183/50-69, P-40-42    Treatment: For Pacer, norvasc start, stop cardizem, post proc start thyroid suppl, restart HCTZ    Please clarify and document your clinical opinion in the progress notes and discharge summary including the definitive and/or presumptive diagnosis, (suspected or probable), related to the above clinical findings. Please include clinical findings supporting your diagnosis. Thank you!   Ingrid Whalen RN, CDMP

## 2017-05-16 NOTE — H&P
Admission    NAME: Truong Ford   :  1935   MRN:  [id number]     Date/Time:  5/15/2017 9:17 PM    Patient PCP: Debbie Dennis MD  ________________________________________________________________________     Assessment:     1. High degree AV block (2:1)  2. Hypertension  3. Mitral valve prolapse  4. Prior supraventricular tachycardia  5. FULL CODE      Plan:   Hemodynamically stable    1. NPO after MN  2. Discontinue her diltiazem and avoid additional AV stefani blocking agents  3. Echo in the morning to evaluate her EF and MVP  4. Start norvasc in place of diltiazem  5. Will have Dr. Coco Dickinson see her in the morning to evaluate the necessity for a PPM once diltiazem has washed out  6. Continue HCTZ  7. Labs now; check TSH  8. Trend troponins      [x]        High complexity decision making was performed        Subjective:   CHIEF COMPLAINT:  CP    HISTORY OF PRESENT ILLNESS:     Anil Silverio is a 80 y.o.  female who began having left sided chest pain (sharp) w/o radiation last night while at rest.  She took two NTG without much relief (very old NTG) and then went to the OSH. She had no nausea or sweats. She was found to be hypertensive and in a 2:1 AVB. She was flown here by helicopter. There has been no recurrence of her CP. No dizziness, LH, or syncope. She is very active. We were asked to admit for work up and evaluation of the above problems.      Past Medical History:   Diagnosis Date    Hypertension     MVP (mitral valve prolapse)       Past Surgical History:   Procedure Laterality Date    HX COLONOSCOPY       Allergies   Allergen Reactions    Verapamil Unknown (comments)      Meds:  See below  Social History   Substance Use Topics    Smoking status: Never Smoker    Smokeless tobacco: Never Used    Alcohol use No      Family History   Problem Relation Age of Onset    No Known Problems Mother        REVIEW OF SYSTEMS:     []         Unable to obtain  ROS due to ---   [x]         Total of 12 systems reviewed as follows:    Constitutional: negative fever, negative chills, negative weight loss  Eyes:   negative visual changes  ENT:   negative sore throat, tongue or lip swelling  Respiratory:  negative cough, negative dyspnea  Cards:  negative for chest pain, palpitations, lower extremity edema  GI:   negative for nausea, vomiting, diarrhea, and abdominal pain  Genitourinary: negative for frequency, dysuria  Integument:  negative for rash   Hematologic:  negative for easy bruising and gum/nose bleeding  Musculoskel: negative for myalgias,  back pain  Neurological:  negative for headaches, dizziness, vertigo, weakness  Behavl/Psych: negative for feelings of anxiety, depression     Pertinent Positives include :    Objective:      Physical Exam:    Last 24hrs VS reviewed since prior progress note. Most recent are: There were no vitals taken for this visit. No intake or output data in the 24 hours ending 05/15/17 2117     General Appearance: Well developed, well nourished, alert & oriented x 3,    no acute distress. Ears/Nose/Mouth/Throat: Pupils equal and round, Hearing grossly normal.  Neck: Supple. JVP within normal limits. Carotids good upstrokes, with no bruit. Chest: Lungs clear to auscultation bilaterally. Cardiovascular: Regular rate and rhythm, bradycardic, S1S2 normal, late systolic murmur +/- early click  Abdomen: Soft, non-tender, bowel sounds are active. No organomegaly. Extremities: No edema bilaterally. Femoral pulses +2, Distal Pulses +1. Skin: Warm and dry. Neuro: CN II-XII grossly intact, Strength and sensation grossly intact. []         Post-cath site without hematoma, bruit, tenderness, or thrill. Distal pulses intact. Data:      Prior to Admission medications    Medication Sig Start Date End Date Taking? Authorizing Provider   diltiazem [facility]) 300 mg SR capsule Take 300 mg by mouth daily.     Historical Provider   citalopram (CELEXA) 20 mg tablet Take  by mouth as needed. Historical Provider   calcium-cholecalciferol, D3, (CALTRATE 600+D) tablet Take 1 Tab by mouth daily. Historical Provider   cholecalciferol, vitamin D3, (VITAMIN D3) 2,000 unit tab Take  by mouth. Historical Provider   cyanocobalamin (VITAMIN B-12) 500 mcg tablet Take 500 mcg by mouth daily. Historical Provider   vitamin e (AQUA GEMS) 200 unit capsule Take  by mouth daily. Historical Provider   hydrochlorothiazide (HYDRODIURIL) 25 mg tablet Take 25 mg by mouth daily. Historical Provider       Recent Results (from the past 24 hour(s))   EKG, 12 LEAD, INITIAL    Collection Time: 05/15/17  6:12 PM   Result Value Ref Range    Ventricular Rate 44 BPM    Atrial Rate 79 BPM    QRS Duration 78 ms    Q-T Interval 540 ms    QTC Calculation (Bezet) 461 ms    Calculated P Axis 76 degrees    Calculated R Axis -17 degrees    Calculated T Axis 33 degrees    Diagnosis       Sinus rhythm with complete heart block and Junctional bradycardia  Possible Anterior infarct , age undetermined  Abnormal ECG  No previous ECGs available     CBC WITH AUTOMATED DIFF    Collection Time: 05/15/17  6:15 PM   Result Value Ref Range    WBC 6.8 3.6 - 11.0 K/uL    RBC 4.50 3.80 - 5.20 M/uL    HGB 13.3 11.5 - 16.0 g/dL    HCT 41.0 35.0 - 47.0 %    MCV 91.1 80.0 - 99.0 FL    MCH 29.6 26.0 - 34.0 PG    MCHC 32.4 30.0 - 36.5 g/dL    RDW 13.6 11.5 - 14.5 %    PLATELET 078 878 - 074 K/uL    NEUTROPHILS 44 32 - 75 %    LYMPHOCYTES 43 12 - 49 %    MONOCYTES 10 5 - 13 %    EOSINOPHILS 2 0 - 7 %    BASOPHILS 1 0 - 1 %    ABS. NEUTROPHILS 3.0 1.8 - 8.0 K/UL    ABS. LYMPHOCYTES 2.9 0.8 - 3.5 K/UL    ABS. MONOCYTES 0.7 0.0 - 1.0 K/UL    ABS. EOSINOPHILS 0.2 0.0 - 0.4 K/UL    ABS.  BASOPHILS 0.0 0.0 - 0.1 K/UL   TROPONIN I    Collection Time: 05/15/17  6:15 PM   Result Value Ref Range    Troponin-I, Qt. <0.04 <1.26 ng/mL   METABOLIC PANEL, COMPREHENSIVE    Collection Time: 05/15/17  6:15 PM   Result Value Ref Range Sodium 143 136 - 145 mmol/L    Potassium 4.3 3.5 - 5.1 mmol/L    Chloride 103 97 - 108 mmol/L    CO2 26 21 - 32 mmol/L    Anion gap 14 5 - 15 mmol/L    Glucose 96 65 - 100 mg/dL    BUN 25 (H) 7.0 - 18.0 MG/DL    Creatinine 1.00 0.55 - 1.02 MG/DL    BUN/Creatinine ratio 25 (H) 12 - 20      GFR est AA >60 >60 ml/min/1.73m2    GFR est non-AA 53 (L) >60 ml/min/1.73m2    Calcium 9.4 8.5 - 10.1 MG/DL    Bilirubin, total 0.4 0.2 - 1.0 MG/DL    ALT (SGPT) 34 14 - 63 U/L    AST (SGOT) 43 (H) 15 - 37 U/L    Alk.  phosphatase 87 45 - 117 U/L    Protein, total 7.7 6.4 - 8.2 g/dL    Albumin 4.1 3.5 - 5.0 g/dL    Globulin 3.6 2.0 - 4.0 g/dL    A-G Ratio 1.1 1.1 - 2.2     CK W/ CKMB & INDEX    Collection Time: 05/15/17  6:15 PM   Result Value Ref Range    CK 70 26 - 192 U/L    CK - MB <1.0 <3.6 NG/ML    CK-MB Index Cannot be calulated 0 - 2.5     MAGNESIUM    Collection Time: 05/15/17  6:15 PM   Result Value Ref Range    Magnesium 2.4 1.6 - 2.4 mg/dL   PROTHROMBIN TIME + INR    Collection Time: 05/15/17  6:15 PM   Result Value Ref Range    INR 0.9 0.9 - 1.1      Prothrombin time 10.9 10.0 - 13.0 sec   URINALYSIS W/ RFLX MICROSCOPIC    Collection Time: 05/15/17  6:15 PM   Result Value Ref Range    Color YELLOW/STRAW      Appearance CLEAR CLEAR      Specific gravity 1.020 1.003 - 1.030      pH (UA) 7.5 5.0 - 8.0      Protein NEGATIVE  NEG mg/dL    Glucose NEGATIVE  NEG mg/dL    Ketone NEGATIVE  NEG mg/dL    Bilirubin NEGATIVE  NEG      Blood NEGATIVE  NEG      Urobilinogen 0.2 0.2 - 1.0 EU/dL    Nitrites NEGATIVE  NEG      Leukocyte Esterase SMALL (A) NEG     URINE MICROSCOPIC ONLY    Collection Time: 05/15/17  6:15 PM   Result Value Ref Range    WBC 5-10 0 - 4 /hpf    RBC 0-5 0 - 5 /hpf    Epithelial cells FEW FEW /lpf    Bacteria NEGATIVE  NEG /hpf   TSH 3RD GENERATION    Collection Time: 05/15/17  7:15 PM   Result Value Ref Range    TSH 5.89 (H) 0.34 - 4.82 uIU/mL   EKG, 12 LEAD, SUBSEQUENT    Collection Time: 05/15/17  7:31 PM Result Value Ref Range    Ventricular Rate 42 BPM    Atrial Rate 42 BPM    P-R Interval 260 ms    QRS Duration 84 ms    Q-T Interval 606 ms    QTC Calculation (Bezet) 506 ms    Calculated P Axis 75 degrees    Calculated R Axis -46 degrees    Calculated T Axis 50 degrees    Diagnosis       Marked sinus bradycardia with 1st degree AV block  Left axis deviation  Inferior infarct , age undetermined  Abnormal ECG  When compared with ECG of 15-MAY-2017 18:12,  MANUAL COMPARISON REQUIRED, DATA IS UNCONFIRMED     EKG, 12 LEAD, INITIAL    Collection Time: 05/15/17  8:40 PM   Result Value Ref Range    Ventricular Rate 52 BPM    Atrial Rate 52 BPM    P-R Interval 256 ms    QRS Duration 82 ms    Q-T Interval 494 ms    QTC Calculation (Bezet) 459 ms    Calculated P Axis 77 degrees    Calculated R Axis -35 degrees    Calculated T Axis 57 degrees    Diagnosis       Sinus bradycardia with 1st degree AV block with occasional premature   ventricular complexes  Possible Left atrial enlargement  Left axis deviation  Cannot rule out Inferior infarct , age undetermined  Abnormal ECG  When compared with ECG of 15-MAY-2017 19:31,  MANUAL COMPARISON REQUIRED, DATA IS UNCONFIRMED           Melissa Vasquez III, DO

## 2017-05-16 NOTE — ROUTINE PROCESS
Bedside and Verbal shift change report given to Chico Ferreira RN  (oncoming nurse) byJOSE MANUEL QUINTEROS (offgoing nurse). Report included the following information SBAR, Kardex, Procedure Summary, Intake/Output, MAR, Recent Results and Cardiac Rhythm Sinus Gupta Bouquet   Patient seen in the ED for 3rd Degree Heart block, MD aware patient heart rate upper 30' - 40's. Patient to get a Pacemaker in the AM.  Patient is NPO after midnight. Doctor will need to get consent in the AM.. Bedside and Verbal shift change report given to Viet Castillo RN (oncoming nurse) by Chico Ferreira RN (offgoing nurse). Report included the following information SBAR, Kardex, Procedure Summary, Intake/Output, MAR, Recent Results and Cardiac Rhythm Sinus Gupta Bouquet.

## 2017-05-16 NOTE — PROCEDURES
[address]  [phone]    Patient ID:  Patient: Zana Jarret  MRN: [id number]  Age: 80 y.o.  : 1935  Gender: female  Study Date: 2017    History: This is a female with nonreversible complete heart block, here for permanent dual chamber pacemaker implant. Procedures Performed:  1. DUAL CHAMBER PACEMAKER (17369)    The patient was brought to the EP lab in a postabsorptive state after informed consent had been previously obtained. Continuous electrocardiographic and hemodynamic monitoring was performed. Sedation was performed by the nurse who was in constant attendance and my supervision throughout the procedure. Versed and fentanyl was used, start time 18:15, stop time 19:00 (45 minutes). Using 1% lidocaine with epinephrine, the left chest site was anesthetized. The pocket was formed in the usual fashion and ultimately axillary venous access was obtained using a micropuncture needle. Two safe sheaths were placed. The right ventricular lead ([id number]/58 cm) was advanced to the RV apex. The right atrial lead ([id number]/52 cm) was advanced to the RA appendage. Each lead was fixed and tested, performance verified. The leads were anchored to the pocket floor using two 0-silk sutures at each anchor sleeve. The pulse generator was connected to the leads and placed in the pocket after hemostasis was confirmed. Vigorous irrigation with antibiotic solution was performed. A single 0-silk suture was used to anchor the pulse generator to the pocket floor. The pocket was closed using a running 2-0 Vicryl layer x1, followed by a more superficial layer of running 4-0 Vicryl in a subcuticular fashion to close the skin. Final fluoroscopic check revealed adequate redundancy of the leads and the absence of pneumothorax. Dermabond was applied. Preoperative Diagnosis: As above. Postoperative Diagnosis: As above.   Procedure: As above. Surgeon(s) and Role:  Jarrett Burdick MD - Primary   Anesthesia:   MAC. Estimated Blood Loss:  <5 cc. Specimens: * No specimens in log *   Findings:  As below. Complications:  None. X-ray time:  4.3 minutes      SETTINGS:  SJM 2272, [id number] ([employer] MRI)  RA 1.2, 1.75, 510  RV 4.6, 0.75, 780  DDD     Recommendations:  After successful dual chamber permanent pacemaker implant to the left chest using transvenous leads, routine follow-up in 2-4 weeks for wound and device check.     Signed:  Mariela Charlton MD

## 2017-05-16 NOTE — CARDIO/PULMONARY
C/P Rehab Note:      80year old female who was admitted 5/15/17 with left sided chest pain while at rest.  Found to be hypertensive and in a 2:1 AVB. Flown here by helicopter. Dr. Jc Reddy saw in consult today and states she is currently in complete heart block. Hx. of paroxysmal SVT on diltiazem. Hypertensive heart disease without heart failure. MVP. For pacemaker insertion most likely later today. Non - smoker. Met with patient briefly who was getting washed up with assistance of patient tech. Told patient we would follow up with her post procedure.

## 2017-05-16 NOTE — PROGRESS NOTES
PCU SHIFT NURSING NOTE      Bedside and Verbal shift change report given to Luann Seip, RN (oncoming nurse) by Dayton Barron RN (offgoing nurse). Report included the following information SBAR, Kardex, ED Summary, Procedure Summary, Intake/Output, MAR, Recent Results, Med Rec Status, Cardiac Rhythm SB and Alarm Parameters . Shift Summary:         Admission Date 5/15/2017   Admission Diagnosis Atrioventricular block   Consults IP CONSULT TO ENDOCRINOLOGY        Consults   [x]PT   [x]OT   []Speech   [x]Case Management      [] Palliative      Cardiac Monitoring Order   [x]Yes   []No     IV drips   []Yes    Drip:                            Dose:  Drip:                            Dose:  Drip:                            Dose:   [x]No     GI Prophylaxis   [x]Yes   []No         DVT Prophylaxis   SCDs:             Eleuterio stockings:         [x] Medication   []Contraindicated   []None      Activity Level Activity Level: Head of bed elevated (degrees), Up with Assistance     Activity Assistance: Partial (one person)   Purposeful Rounding every 1-2 hour? [x]Yes   Swan Score  Total Score: 3   Bed Alarm (If score 3 or >)   [x]Yes   [] Refused (See signed refusal form in chart)   Santiago Score  Santiago Score: 18   Santiago Score (if score 14 or less)   [x]PMT consult   []Wound Care consult      []Specialty bed   [] Nutrition consult          Needs prior to discharge:   Home O2 required:    []Yes   [x]No    If yes, how much O2 required?     Other:    Last Bowel Movement: Last Bowel Movement Date: 05/13/17      Influenza Vaccine Received Flu Vaccine for Current Season (usually Sept-March): Not Flu Season        Pneumonia Vaccine           Diet Active Orders   Diet    DIET NPO      LDAs               Peripheral IV 05/15/17 Right Forearm (Active)   Site Assessment Clean, dry, & intact 5/16/2017  3:30 PM   Phlebitis Assessment 0 5/16/2017  3:30 PM   Infiltration Assessment 0 5/16/2017  3:30 PM   Dressing Status Clean, dry, & intact 5/16/2017  3:30 PM   Dressing Type Tape;Transparent 5/16/2017  3:30 PM   Hub Color/Line Status Pink;Capped;Flushed 5/16/2017  3:30 PM   Alcohol Cap Used No 5/15/2017  6:37 PM       Peripheral IV 05/16/17 Left Forearm (Active)   Site Assessment Clean, dry, & intact 5/16/2017  3:30 PM   Phlebitis Assessment 0 5/16/2017  3:30 PM   Infiltration Assessment 0 5/16/2017  3:30 PM   Dressing Status Clean, dry, & intact 5/16/2017  3:30 PM   Dressing Type Tape;Transparent 5/16/2017  3:30 PM   Hub Color/Line Status Pink;Capped;Flushed 5/16/2017  3:30 PM                      Urinary Catheter      Intake & Output   Date 05/15/17 0700 - 05/16/17 0659 05/16/17 0700 - 05/17/17 0659   Shift 0170-3160 3801-7364 24 Hour Total 3804-0633 8123-0712 24 Hour Total   I  N  T  A  K  E   P. O.    0  0      P. O.    0  0    Shift Total  (mL/kg)    0  (0)  0  (0)   O  U  T  P  U  T   Urine  600  (0.9) 600  (0.4) 975  975      Urine Voided  600 600 975  975    Stool            Stool Occurrence(s)    0 x  0 x    Shift Total  (mL/kg)  600  (10.6) 600  (10.6) 975  (17.2)  975  (17.2)   NET  -600 -600 -975  -975   Weight (kg)  56.6 56.6 56.6 56.6 56.6         Readmission Risk Assessment Tool Score Medium Risk            14       Total Score        3 Relationship with PCP    2 Patient Living Status    4 More than 1 Admission in calendar year    5 Patient Insurance is Medicare, Medicaid or Self Pay        Criteria that do not apply:    Patient Length of Stay > 5    Charlson Comorbidity Score       Expected Length of Stay 2d 0h   Actual Length of Stay 1

## 2017-05-16 NOTE — CONSULTS
Consult    Patient: Jose Torres MRN: [id number]  SSN: [id number]    YOB: 1935  Age: 80 y.o. Sex: female      Subjective:      Jose Torres is a 80 y.o. female who is being seen for hypothyroidism. Ms Yoni Blackwell reports that she was having sharp chest pain with radiation to her back and her SBP were in the 180's range with very low pulse (40's). She was airlifted to [address] and was found to be in severe heart block. Pt has hx of SVT and was on Diltiazem. She was taken off the Diltiazem and placed on Norvasc. Her BPs improved but she remained in heart block. She is scheduled for placement of pacemaker this afternoon. As part of the work-up for heart block she had a TSH drawn that was 5.9, a repeat test came back at 6.9. Her Free T4 was in a normal range at 1.2. Pt has no hx of thyroid issues (hyperthyroid or hypothyroidism), she denies hx of any neck or thyroid surgeries or any hx of Head or Neck radiation therapy or any known radiation exposures. Prior to her admission she was not having issues of heat or cold intolerance, tremors, weight changes. She does note she had some constipation for the last 3 months, which she has been treating with powered fiber, she also notes some increased gas and belching for the last few months. She denies issues of dysphagia, dysphonia or chocking issues.      Past Medical History:   Diagnosis Date    Hypertension     MVP (mitral valve prolapse)      Past Surgical History:   Procedure Laterality Date    HX COLONOSCOPY        Family History   Problem Relation Age of Onset    No Known Problems Mother      Social History   Substance Use Topics    Smoking status: Never Smoker    Smokeless tobacco: Never Used    Alcohol use No      Current Facility-Administered Medications   Medication Dose Route Frequency Provider Last Rate Last Dose    calcium-vitamin D (OS-PILI) 500 mg-200 unit tablet  1 Tab Oral DAILY Vale Quinonez III, DO   Stopped at 05/16/17 0900    citalopram (CELEXA) tablet 20 mg  20 mg Oral DAILY Anamaria Fitting III, DO   Stopped at 05/16/17 0900    cyanocobalamin (VITAMIN B12) tablet 500 mcg  500 mcg Oral DAILY Chelsea Katy Paula III, DO   Stopped at 05/16/17 0900    hydroCHLOROthiazide (HYDRODIURIL) tablet 25 mg  25 mg Oral DAILY Chelsea Katy Paula III, DO   Stopped at 05/16/17 0900    sodium chloride (NS) flush 5-10 mL  5-10 mL IntraVENous Q8H Adam H Paula III, DO   10 mL at 05/16/17 0913    sodium chloride (NS) flush 5-10 mL  5-10 mL IntraVENous PRN Chelsea Katy Paula III, DO   10 mL at 05/15/17 2153    acetaminophen (TYLENOL) tablet 650 mg  650 mg Oral Q4H PRN Anamaria Fitting III, DO        HYDROcodone-acetaminophen (NORCO) 5-325 mg per tablet 1 Tab  1 Tab Oral Q4H PRN Anamaria Fitting III, DO        morphine injection 4 mg  4 mg IntraVENous Q4H PRN Anamaria Fitting III, DO        naloxone [facility]) injection 0.4 mg  0.4 mg IntraVENous PRN Chelsea Katy Paula III, DO        ondansetron [facility]) injection 4 mg  4 mg IntraVENous Q4H PRN Chelsea Katy Paula III, DO        docusate sodium (COLACE) capsule 100 mg  100 mg Oral BID Chelsea Katy Paula III, DO   Stopped at 05/16/17 0900    heparin (porcine) injection 5,000 Units  5,000 Units SubCUTAneous Q8H Adam H Paula III, DO   5,000 Units at 05/16/17 3969    amLODIPine (NORVASC) tablet 5 mg  5 mg Oral DAILY Chelsea Katy Paula III, DO   5 mg at 05/15/17 2152        Allergies   Allergen Reactions    Verapamil Unknown (comments)       Review of Systems:  A comprehensive review of systems was negative except for that written in the History of Present Illness.     Objective:     Vitals:    05/16/17 0030 05/16/17 0100 05/16/17 0730 05/16/17 1046   BP: (!) 137/37 163/49 164/48 162/48   Pulse: (!) 37 (!) 37 (!) 41 (!) 43   Resp:   16 18   Temp:   97.7 °F (36.5 °C) 98.5 °F (36.9 °C)   SpO2: 95% 97% 98% 100%   Weight:       Height:            Physical Exam:  GENERAL: alert, cooperative, no distress, appears stated age  EYE: negative  LYMPHATIC: Cervical, supraclavicular, and axillary nodes normal.   THROAT & NECK: normal and no erythema or exudates noted. LUNG: clear to auscultation bilaterally  HEART: Rhythm regular, rate in the 50's  ABDOMEN: soft, non-tender. Bowel sounds normal. No masses,  no organomegaly  EXTREMITIES:  extremities normal, atraumatic, no cyanosis or edema  SKIN: Normal.    Component      Latest Ref Rng & Units 5/16/2017 5/16/2017 5/16/2017 5/15/2017           8:50 AM  5:30 AM  5:30 AM  9:48 PM   WBC      3.6 - 11.0 K/uL   6.8    RBC      3.80 - 5.20 M/uL   4.45    HGB      11.5 - 16.0 g/dL   13.5    HCT      35.0 - 47.0 %   40.1    MCV      80.0 - 99.0 FL   90.1    MCH      26.0 - 34.0 PG   30.3    MCHC      30.0 - 36.5 g/dL   33.7    RDW      11.5 - 14.5 %   13.2    PLATELET      068 - 871 K/uL   213    NEUTROPHILS      32 - 75 %   43    LYMPHOCYTES      12 - 49 %   44    MONOCYTES      5 - 13 %   10    EOSINOPHILS      0 - 7 %   2    BASOPHILS      0 - 1 %   1    ABS. NEUTROPHILS      1.8 - 8.0 K/UL   3.0    ABS. LYMPHOCYTES      0.8 - 3.5 K/UL   3.0    ABS. MONOCYTES      0.0 - 1.0 K/UL   0.7    ABS. EOSINOPHILS      0.0 - 0.4 K/UL   0.1    ABS.  BASOPHILS      0.0 - 0.1 K/UL   0.1    Sodium      136 - 145 mmol/L  142     Potassium      3.5 - 5.1 mmol/L  3.6     Chloride      97 - 108 mmol/L  108     CO2      21 - 32 mmol/L  25     Anion gap      5 - 15 mmol/L  9     Glucose      65 - 100 mg/dL  82     BUN      6 - 20 MG/DL  17     Creatinine      0.55 - 1.02 MG/DL  0.89     BUN/Creatinine ratio      12 - 20    19     GFR est AA      >60 ml/min/1.73m2  >60     GFR est non-AA      >60 ml/min/1.73m2  >60     Calcium      8.5 - 10.1 MG/DL  9.1     TSH      0.36 - 3.74 uIU/mL    6.96 (H)   T4, Free      0.8 - 1.5 NG/DL 1.2        Component      Latest Ref Rng & Units 5/15/2017           7:15 PM   WBC      3.6 - 11.0 K/uL    RBC      3.80 - 5.20 M/uL    HGB      11.5 - 16.0 g/dL HCT      35.0 - 47.0 %    MCV      80.0 - 99.0 FL    MCH      26.0 - 34.0 PG    MCHC      30.0 - 36.5 g/dL    RDW      11.5 - 14.5 %    PLATELET      705 - 300 K/uL    NEUTROPHILS      32 - 75 %    LYMPHOCYTES      12 - 49 %    MONOCYTES      5 - 13 %    EOSINOPHILS      0 - 7 %    BASOPHILS      0 - 1 %    ABS. NEUTROPHILS      1.8 - 8.0 K/UL    ABS. LYMPHOCYTES      0.8 - 3.5 K/UL    ABS. MONOCYTES      0.0 - 1.0 K/UL    ABS. EOSINOPHILS      0.0 - 0.4 K/UL    ABS. BASOPHILS      0.0 - 0.1 K/UL    Sodium      136 - 145 mmol/L    Potassium      3.5 - 5.1 mmol/L    Chloride      97 - 108 mmol/L    CO2      21 - 32 mmol/L    Anion gap      5 - 15 mmol/L    Glucose      65 - 100 mg/dL    BUN      6 - 20 MG/DL    Creatinine      0.55 - 1.02 MG/DL    BUN/Creatinine ratio      12 - 20      GFR est AA      >60 ml/min/1.73m2    GFR est non-AA      >60 ml/min/1.73m2    Calcium      8.5 - 10.1 MG/DL    TSH      0.36 - 3.74 uIU/mL 5.89 (H)   T4, Free      0.8 - 1.5 NG/DL      Assessment:     Hospital Problems  Date Reviewed: 5/15/2017          Codes Class Noted POA    Atrioventricular block ICD-10-CM: I44.30  ICD-9-CM: 426.10  5/15/2017 Unknown              Plan:     1) Hypothyroidism > Ms Lisette Perez does have a mildly elevated TSH, but she is clinically euthyroid by history and exam and in an [de-identified] y/o female a TSH in the 5-6 range is not uncommon, studies have shown that TSH tends to increase as we age. Also, her Free T4 level was in a good range at 1.2. At this point I don't feel that she is a candidate for thyroid hormone replacement as I am not convinced she has hypothyroidism. I would recommend not starting any Levothyroxine, particularly with her hx of arrhythmia. I would like to see her in my office in 4 weeks for repeat thyroid testing once she has recovered from this hospitalization.     I spent 50 minutes of face to face time on her case and > 50% of the time was spent reviewing the chart and coordinating her care with the nurse caring for her.           Signed By: Major Edouard MD     May 16, 2017

## 2017-05-16 NOTE — PROGRESS NOTES
EP/ End of Procedure/ TRANSFER - OUT REPORT:    Verbal report given to LPM,RN on Hali Crum for routine progression of care       Report consisted of patients Situation, Background, Assessment and   Recommendations(SBAR). Information from the following report(s) SBAR, Kardex, Procedure Summary and MAR was reviewed with the receiving nurse. Opportunity for questions and clarification was provided.

## 2017-05-16 NOTE — ED PROVIDER NOTES
HPI Comments: Pt 79 yo female transferred from [address] [place] [address] for bradycardia and heart block. Pt went to the ED at [facility] with complaint of weakness, chest discomfort. The night before she had some discomfort in her chest and she had an old bottle of NTG and took one w/o relief. Pt has no known cardiac hx. At the time of arrival in ED pt with no active complaints. Patient is a 80 y.o. female presenting with palpitations. Palpitations    Associated symptoms include chest pain and shortness of breath (w/ exertion). Pertinent negatives include no fever, no numbness, no abdominal pain, no nausea, no vomiting, no headaches, no dizziness, no weakness and no cough. Past Medical History:   Diagnosis Date    Hypertension     MVP (mitral valve prolapse)        Past Surgical History:   Procedure Laterality Date    HX COLONOSCOPY           Family History:   Problem Relation Age of Onset    No Known Problems Mother        Social History     Social History    Marital status:      Spouse name: N/A    Number of children: N/A    Years of education: N/A     Occupational History    Not on file. Social History Main Topics    Smoking status: Never Smoker    Smokeless tobacco: Never Used    Alcohol use No    Drug use: Not on file    Sexual activity: Not on file     Other Topics Concern    Not on file     Social History Narrative         ALLERGIES: Verapamil    Review of Systems   Constitutional: Negative. Negative for appetite change, chills, fatigue and fever. HENT: Negative. Negative for congestion, rhinorrhea, sinus pressure and sore throat. Eyes: Negative. Respiratory: Positive for shortness of breath (w/ exertion). Negative for cough, choking, chest tightness and wheezing. Cardiovascular: Positive for chest pain and palpitations. Negative for leg swelling. Gastrointestinal: Negative for abdominal pain, constipation, diarrhea, nausea and vomiting. Endocrine: Negative. Genitourinary: Negative. Negative for difficulty urinating, dysuria, flank pain and urgency. Musculoskeletal: Negative. Skin: Negative. Neurological: Negative. Negative for dizziness, speech difficulty, weakness, light-headedness, numbness and headaches. Psychiatric/Behavioral: Negative. All other systems reviewed and are negative. Patient Vitals for the past 12 hrs:   Temp Pulse Resp BP SpO2   05/15/17 2245 - (!) 41 15 164/53 96 %   05/15/17 2230 - (!) 41 16 177/71 96 %   05/15/17 2215 - (!) 43 15 168/51 96 %   05/15/17 2200 - (!) 44 17 197/67 98 %   05/15/17 2145 - (!) 42 17 181/65 97 %   05/15/17 2130 - (!) 46 13 (!) 191/108 98 %   05/15/17 2117 98.3 °F (36.8 °C) (!) 44 14 (!) 179/110 96 %     Physical Exam   Constitutional: She is oriented to person, place, and time. She appears well-developed and well-nourished. No distress. HENT:   Head: Normocephalic and atraumatic. Mouth/Throat: Oropharynx is clear and moist. No oropharyngeal exudate. Eyes: Conjunctivae and EOM are normal. Pupils are equal, round, and reactive to light. Neck: Normal range of motion. Neck supple. No JVD present. No tracheal deviation present. Cardiovascular: Regular rhythm, normal heart sounds and intact distal pulses. No murmur heard. Bradycardia   Pulmonary/Chest: Effort normal and breath sounds normal. No stridor. No respiratory distress. She has no wheezes. She has no rales. She exhibits no tenderness. Pacing pads in place   Abdominal: Soft. She exhibits no distension. There is no tenderness. There is no rebound and no guarding. Musculoskeletal: Normal range of motion. She exhibits no edema or tenderness. Neurological: She is alert and oriented to person, place, and time. No cranial nerve deficit. No gross motor or sensory deficits    Skin: Skin is warm and dry. She is not diaphoretic. Psychiatric: She has a normal mood and affect. Her behavior is normal.   Nursing note and vitals reviewed. MDM  Number of Diagnoses or Management Options  Complete heart block [facility]):   Diagnosis management comments: Diff dx- Heart block       Amount and/or Complexity of Data Reviewed  Clinical lab tests: ordered and reviewed  Tests in the medicine section of CPT®: ordered and reviewed  Review and summarize past medical records: yes  Discuss the patient with other providers: yes (Cardiology)  Independent visualization of images, tracings, or specimens: yes    Patient Progress  Patient progress: stable    ED Course       Procedures    CONSULT NOTE:   10:11 PM  Anika Cagle DO spoke with Tomas Rodriguez MD,   Specialty: Cardiology  Discussed pt's hx, disposition, and available diagnostic and imaging results. Reviewed care plans. Consultant agrees with plans as outlined. Dr. Delmy Montez will admit pt to the hospital.   Written by Akil Townsend ED Scribe, as dictated by Anika Cagle DO.    LABORATORY TESTS:  Recent Results (from the past 12 hour(s))   EKG, 12 LEAD, INITIAL    Collection Time: 05/15/17  6:12 PM   Result Value Ref Range    Ventricular Rate 44 BPM    Atrial Rate 79 BPM    QRS Duration 78 ms    Q-T Interval 540 ms    QTC Calculation (Bezet) 461 ms    Calculated P Axis 76 degrees    Calculated R Axis -17 degrees    Calculated T Axis 33 degrees    Diagnosis       Sinus rhythm with complete heart block and Junctional bradycardia  Possible Anterior infarct , age undetermined  Abnormal ECG  No previous ECGs available     CBC WITH AUTOMATED DIFF    Collection Time: 05/15/17  6:15 PM   Result Value Ref Range    WBC 6.8 3.6 - 11.0 K/uL    RBC 4.50 3.80 - 5.20 M/uL    HGB 13.3 11.5 - 16.0 g/dL    HCT 41.0 35.0 - 47.0 %    MCV 91.1 80.0 - 99.0 FL    MCH 29.6 26.0 - 34.0 PG    MCHC 32.4 30.0 - 36.5 g/dL    RDW 13.6 11.5 - 14.5 %    PLATELET 022 099 - 067 K/uL    NEUTROPHILS 44 32 - 75 %    LYMPHOCYTES 43 12 - 49 %    MONOCYTES 10 5 - 13 %    EOSINOPHILS 2 0 - 7 %    BASOPHILS 1 0 - 1 %    ABS.  NEUTROPHILS 3.0 1.8 - 8.0 K/UL    ABS. LYMPHOCYTES 2.9 0.8 - 3.5 K/UL    ABS. MONOCYTES 0.7 0.0 - 1.0 K/UL    ABS. EOSINOPHILS 0.2 0.0 - 0.4 K/UL    ABS. BASOPHILS 0.0 0.0 - 0.1 K/UL   TROPONIN I    Collection Time: 05/15/17  6:15 PM   Result Value Ref Range    Troponin-I, Qt. <0.04 <3.40 ng/mL   METABOLIC PANEL, COMPREHENSIVE    Collection Time: 05/15/17  6:15 PM   Result Value Ref Range    Sodium 143 136 - 145 mmol/L    Potassium 4.3 3.5 - 5.1 mmol/L    Chloride 103 97 - 108 mmol/L    CO2 26 21 - 32 mmol/L    Anion gap 14 5 - 15 mmol/L    Glucose 96 65 - 100 mg/dL    BUN 25 (H) 7.0 - 18.0 MG/DL    Creatinine 1.00 0.55 - 1.02 MG/DL    BUN/Creatinine ratio 25 (H) 12 - 20      GFR est AA >60 >60 ml/min/1.73m2    GFR est non-AA 53 (L) >60 ml/min/1.73m2    Calcium 9.4 8.5 - 10.1 MG/DL    Bilirubin, total 0.4 0.2 - 1.0 MG/DL    ALT (SGPT) 34 14 - 63 U/L    AST (SGOT) 43 (H) 15 - 37 U/L    Alk.  phosphatase 87 45 - 117 U/L    Protein, total 7.7 6.4 - 8.2 g/dL    Albumin 4.1 3.5 - 5.0 g/dL    Globulin 3.6 2.0 - 4.0 g/dL    A-G Ratio 1.1 1.1 - 2.2     CK W/ CKMB & INDEX    Collection Time: 05/15/17  6:15 PM   Result Value Ref Range    CK 70 26 - 192 U/L    CK - MB <1.0 <3.6 NG/ML    CK-MB Index Cannot be calulated 0 - 2.5     MAGNESIUM    Collection Time: 05/15/17  6:15 PM   Result Value Ref Range    Magnesium 2.4 1.6 - 2.4 mg/dL   PROTHROMBIN TIME + INR    Collection Time: 05/15/17  6:15 PM   Result Value Ref Range    INR 0.9 0.9 - 1.1      Prothrombin time 10.9 10.0 - 13.0 sec   URINALYSIS W/ RFLX MICROSCOPIC    Collection Time: 05/15/17  6:15 PM   Result Value Ref Range    Color YELLOW/STRAW      Appearance CLEAR CLEAR      Specific gravity 1.020 1.003 - 1.030      pH (UA) 7.5 5.0 - 8.0      Protein NEGATIVE  NEG mg/dL    Glucose NEGATIVE  NEG mg/dL    Ketone NEGATIVE  NEG mg/dL    Bilirubin NEGATIVE  NEG      Blood NEGATIVE  NEG      Urobilinogen 0.2 0.2 - 1.0 EU/dL    Nitrites NEGATIVE  NEG      Leukocyte Esterase SMALL (A) NEG URINE MICROSCOPIC ONLY    Collection Time: 05/15/17  6:15 PM   Result Value Ref Range    WBC 5-10 0 - 4 /hpf    RBC 0-5 0 - 5 /hpf    Epithelial cells FEW FEW /lpf    Bacteria NEGATIVE  NEG /hpf   TSH 3RD GENERATION    Collection Time: 05/15/17  7:15 PM   Result Value Ref Range    TSH 5.89 (H) 0.34 - 4.82 uIU/mL   EKG, 12 LEAD, SUBSEQUENT    Collection Time: 05/15/17  7:31 PM   Result Value Ref Range    Ventricular Rate 42 BPM    Atrial Rate 42 BPM    P-R Interval 260 ms    QRS Duration 84 ms    Q-T Interval 606 ms    QTC Calculation (Bezet) 506 ms    Calculated P Axis 75 degrees    Calculated R Axis -46 degrees    Calculated T Axis 50 degrees    Diagnosis       Marked sinus bradycardia with 1st degree AV block  Left axis deviation  Inferior infarct , age undetermined  Abnormal ECG  When compared with ECG of 15-MAY-2017 18:12,  MANUAL COMPARISON REQUIRED, DATA IS UNCONFIRMED     EKG, 12 LEAD, INITIAL    Collection Time: 05/15/17  8:40 PM   Result Value Ref Range    Ventricular Rate 52 BPM    Atrial Rate 52 BPM    P-R Interval 256 ms    QRS Duration 82 ms    Q-T Interval 494 ms    QTC Calculation (Bezet) 459 ms    Calculated P Axis 77 degrees    Calculated R Axis -35 degrees    Calculated T Axis 57 degrees    Diagnosis       Sinus bradycardia with 1st degree AV block with occasional premature   ventricular complexes  Possible Left atrial enlargement  Left axis deviation  Cannot rule out Inferior infarct , age undetermined  Abnormal ECG  When compared with ECG of 15-MAY-2017 19:31,  MANUAL COMPARISON REQUIRED, DATA IS UNCONFIRMED         MEDICATIONS GIVEN:  Medications   calcium-vitamin D (OS-PILI) 500 mg-200 unit tablet (not administered)   citalopram (CELEXA) tablet 20 mg (not administered)   cyanocobalamin (VITAMIN B12) tablet 500 mcg (not administered)   hydroCHLOROthiazide (HYDRODIURIL) tablet 25 mg (not administered)   sodium chloride (NS) flush 5-10 mL (10 mL IntraVENous Given 5/15/17 9164)   sodium chloride (NS) flush 5-10 mL (10 mL IntraVENous Given 5/15/17 2153)   acetaminophen (TYLENOL) tablet 650 mg (not administered)   HYDROcodone-acetaminophen (NORCO) 5-325 mg per tablet 1 Tab (not administered)   morphine injection 4 mg (not administered)   naloxone (NARCAN) injection 0.4 mg (not administered)   ondansetron (ZOFRAN) injection 4 mg (not administered)   docusate sodium (COLACE) capsule 100 mg (100 mg Oral Given 5/15/17 2152)   heparin (porcine) injection 5,000 Units (not administered)   amLODIPine (NORVASC) tablet 5 mg (5 mg Oral Given 5/15/17 2152)       IMPRESSION:  1. Complete heart block ([address]        PLAN: Admit to cardiology  Admission Note:  10:11 PM  Patient is being admitted to the hospital by Dr. Zina Garcia. The results of their tests and reasons for their admission have been discussed with them and/or available family. They convey agreement and understanding for the need to be admitted and for their admission diagnosis. Written by Galindo Duarte, ED Scribe, as dictated by Jenn Rey DO. Attestation: This note is prepared by Galindo Duarte, acting as Scribe for Jenn Rey, [address], DO: The scribe's documentation has been prepared under my direction and personally reviewed by me in its entirety. I confirm that the note above accurately reflects all work, treatment, procedures, and medical decision making performed by me.

## 2017-05-17 VITALS
SYSTOLIC BLOOD PRESSURE: 147 MMHG | TEMPERATURE: 97.7 F | HEIGHT: 63 IN | RESPIRATION RATE: 18 BRPM | HEART RATE: 73 BPM | WEIGHT: 124.78 LBS | OXYGEN SATURATION: 97 % | BODY MASS INDEX: 22.11 KG/M2 | DIASTOLIC BLOOD PRESSURE: 76 MMHG

## 2017-05-17 LAB
ALBUMIN SERPL-MCNC: 4.2 G/DL (ref 3.5–4.7)
ALBUMIN/GLOB SERPL: 1.6 {RATIO} (ref 1.2–2.2)
ALP SERPL-CCNC: 80 IU/L (ref 39–117)
ALT SERPL-CCNC: 21 IU/L (ref 0–32)
ANION GAP BLD CALC-SCNC: 10 MMOL/L (ref 5–15)
AST SERPL-CCNC: 37 IU/L (ref 0–40)
BASOPHILS # BLD AUTO: 0 K/UL (ref 0–0.1)
BASOPHILS # BLD AUTO: NORMAL 10*3/UL
BASOPHILS # BLD: 1 % (ref 0–1)
BILIRUB SERPL-MCNC: 0.3 MG/DL (ref 0–1.2)
BUN SERPL-MCNC: 24 MG/DL (ref 6–20)
BUN SERPL-MCNC: 26 MG/DL (ref 8–27)
BUN/CREAT SERPL: 24 (ref 12–28)
BUN/CREAT SERPL: 26 (ref 12–20)
CALCIUM SERPL-MCNC: 8.8 MG/DL (ref 8.5–10.1)
CALCIUM SERPL-MCNC: 9.8 MG/DL (ref 8.7–10.3)
CHLORIDE SERPL-SCNC: 103 MMOL/L (ref 97–108)
CHLORIDE SERPL-SCNC: 99 MMOL/L (ref 96–106)
CHOLEST SERPL-MCNC: 184 MG/DL (ref 100–199)
CO2 SERPL-SCNC: 23 MMOL/L (ref 18–29)
CO2 SERPL-SCNC: 25 MMOL/L (ref 21–32)
CREAT SERPL-MCNC: 0.94 MG/DL (ref 0.55–1.02)
CREAT SERPL-MCNC: 1.08 MG/DL (ref 0.57–1)
EOSINOPHIL # BLD AUTO: NORMAL 10*3/UL
EOSINOPHIL # BLD: 0.1 K/UL (ref 0–0.4)
EOSINOPHIL NFR BLD AUTO: NORMAL %
EOSINOPHIL NFR BLD: 2 % (ref 0–7)
ERYTHROCYTE [DISTWIDTH] IN BLOOD BY AUTOMATED COUNT: 13.1 % (ref 11.5–14.5)
GLOBULIN SER CALC-MCNC: 2.7 G/DL (ref 1.5–4.5)
GLUCOSE SERPL-MCNC: 72 MG/DL (ref 65–100)
GLUCOSE SERPL-MCNC: 96 MG/DL (ref 65–99)
HCT VFR BLD AUTO: 40.4 % (ref 35–47)
HCT VFR BLD AUTO: NORMAL %
HDLC SERPL-MCNC: 40 MG/DL
HGB BLD-MCNC: 13.7 G/DL (ref 11.5–16)
HGB BLD-MCNC: NORMAL G/DL
INTERPRETATION, 910389: NORMAL
INTERPRETATION: NORMAL
LDLC SERPL CALC-MCNC: 109 MG/DL (ref 0–99)
LDLC/HDLC SERPL: 2.7 RATIO UNITS (ref 0–3.2)
LYMPHOCYTES # BLD AUTO: 35 % (ref 12–49)
LYMPHOCYTES # BLD AUTO: NORMAL 10*3/UL
LYMPHOCYTES # BLD: 2.2 K/UL (ref 0.8–3.5)
LYMPHOCYTES NFR BLD AUTO: NORMAL %
MCH RBC QN AUTO: 30.6 PG (ref 26–34)
MCHC RBC AUTO-ENTMCNC: 33.9 G/DL (ref 30–36.5)
MCV RBC AUTO: 90.2 FL (ref 80–99)
MONOCYTES # BLD: 0.6 K/UL (ref 0–1)
MONOCYTES NFR BLD AUTO: 9 % (ref 5–13)
MONOCYTES NFR BLD AUTO: NORMAL %
NEUTROPHILS NFR BLD AUTO: NORMAL %
NEUTS SEG # BLD: 3.3 K/UL (ref 1.8–8)
NEUTS SEG NFR BLD AUTO: 53 % (ref 32–75)
PDF IMAGE, 910387: NORMAL
PLATELET # BLD AUTO: 187 K/UL (ref 150–400)
PLATELET # BLD AUTO: NORMAL 10*3/UL
POTASSIUM SERPL-SCNC: 3.6 MMOL/L (ref 3.5–5.1)
POTASSIUM SERPL-SCNC: 5.3 MMOL/L (ref 3.5–5.2)
PROT SERPL-MCNC: 6.9 G/DL (ref 6–8.5)
RBC # BLD AUTO: 4.48 M/UL (ref 3.8–5.2)
RBC # BLD AUTO: NORMAL 10*6/UL
SODIUM SERPL-SCNC: 138 MMOL/L (ref 136–145)
SODIUM SERPL-SCNC: 141 MMOL/L (ref 134–144)
TRIGL SERPL-MCNC: 173 MG/DL (ref 0–149)
VLDLC SERPL CALC-MCNC: 35 MG/DL (ref 5–40)
WBC # BLD AUTO: 6.2 K/UL (ref 3.6–11)
WBC # BLD AUTO: NORMAL X10E3/UL

## 2017-05-17 PROCEDURE — 74011250637 HC RX REV CODE- 250/637: Performed by: INTERNAL MEDICINE

## 2017-05-17 PROCEDURE — 36415 COLL VENOUS BLD VENIPUNCTURE: CPT | Performed by: INTERNAL MEDICINE

## 2017-05-17 PROCEDURE — 80048 BASIC METABOLIC PNL TOTAL CA: CPT | Performed by: INTERNAL MEDICINE

## 2017-05-17 PROCEDURE — 85025 COMPLETE CBC W/AUTO DIFF WBC: CPT | Performed by: INTERNAL MEDICINE

## 2017-05-17 PROCEDURE — 74011250636 HC RX REV CODE- 250/636: Performed by: INTERNAL MEDICINE

## 2017-05-17 RX ADMIN — HEPARIN SODIUM 5000 UNITS: 5000 INJECTION, SOLUTION INTRAVENOUS; SUBCUTANEOUS at 06:57

## 2017-05-17 RX ADMIN — DOCUSATE SODIUM 100 MG: 100 CAPSULE, LIQUID FILLED ORAL at 08:36

## 2017-05-17 RX ADMIN — Medication 10 ML: at 13:56

## 2017-05-17 RX ADMIN — CITALOPRAM HYDROBROMIDE 20 MG: 20 TABLET ORAL at 08:38

## 2017-05-17 RX ADMIN — HEPARIN SODIUM 5000 UNITS: 5000 INJECTION, SOLUTION INTRAVENOUS; SUBCUTANEOUS at 13:56

## 2017-05-17 RX ADMIN — Medication 10 ML: at 06:00

## 2017-05-17 RX ADMIN — CYANOCOBALAMIN TAB 500 MCG 500 MCG: 500 TAB at 08:36

## 2017-05-17 RX ADMIN — DOCUSATE SODIUM 100 MG: 100 CAPSULE, LIQUID FILLED ORAL at 17:15

## 2017-05-17 RX ADMIN — ACETAMINOPHEN 650 MG: 325 TABLET, FILM COATED ORAL at 04:05

## 2017-05-17 RX ADMIN — HYDROCHLOROTHIAZIDE 25 MG: 25 TABLET ORAL at 08:38

## 2017-05-17 RX ADMIN — CALCIUM CARBONATE 500 MG (1,250 MG)-VITAMIN D3 200 UNIT TABLET 1 TABLET: at 08:38

## 2017-05-17 NOTE — DISCHARGE SUMMARY
Cardiology Discharge Summary             Patient ID:  Anjum Tejada  [id number]  89 y.o.  1935    Admit Date: 5/15/2017     Discharge Date: 5/17/2017   Admitting Physician: Tiffanie Emery DO   Discharge Physician: Jesenia Mancera MD    Admission and Discharge Diagnoses include:  1. Complete heart block, symptomatic  2. Chest pain    Cardiology Procedures this Admission:  1.  Echo  2. Permanent dual chamber pacemaker (SJM)      Disposition: home    Patient Instructions:   Current Discharge Medication List      START taking these medications    Details   dilTIAZem CD (CARDIZEM CD) 300 mg ER capsule (or usual formulation which is 300 mg, so this is NOT a new medication) Take 1 Cap by mouth daily. Qty: 30 Cap, Refills: 0         CONTINUE these medications which have NOT CHANGED    Details   citalopram (CELEXA) 20 mg tablet Take  by mouth as needed. cholecalciferol, vitamin D3, (VITAMIN D3) 2,000 unit tab Take  by mouth.      vitamin e (AQUA GEMS) 200 unit capsule Take  by mouth daily. calcium-cholecalciferol, D3, (CALTRATE 600+D) tablet Take 1 Tab by mouth daily. cyanocobalamin (VITAMIN B-12) 500 mcg tablet Take 500 mcg by mouth daily. hydrochlorothiazide (HYDRODIURIL) 25 mg tablet Take 25 mg by mouth daily. FOLLOW-UP:       Call the office [phone] to make an appointment for 2-4 weeks with the nurse. Longterm follow-up can be made with Dr. Octavio Curran. [address] [place] [address], [address] [phone]  [facility], [address] [place] [address]    [email or web address]    Signed:  Jesenia Mancera MD  5/17/2017      ________________________________________________________________________     Assessment on Discharge:     1. Complete heart block with junctional escape in 40's, symptomatic bradycardia. She was in 2:1 AV block yesterday, so she actually has progressed in process of diltiazem washout. S/p dual chamber pacemaker on 5/16.   2. History of paroxysmal supraventricular tachycardia, on diltiazem, previously on verapamil but didn't tolerate. Diltiazem can be restarted with pacer in place. 3. Chest pain, atypical.  Musculoskeletal?  Not thought to be angina. 4. Hypertensive heart disease without heart failure. 5. History of mitral valve prolapse, but not seen. Severe TR, mild AI, mild AS, nonrheumatic. 6. Mildly elevated TSH, with normal thyroid hormone level (T4). 7. FULL CODE. Plan:     1. Continue diltiazem as prophylaxis against SVT. 2. Continue HCTZ. 3. Will not start a thyroid supplement on discharge but will defer the evaluation as appropriate to her PCP. [x]        High complexity decision making was performed. Subjective:      Tyson Davis is a 80 y.o.  female who began having left sided chest pain (sharp) w/o radiation the night PTA while at rest.  She took two NTG without much relief (very old NTG) and then went to an OSH. She had no nausea or sweats. She was found to be hypertensive and in a 2:1 AVB. She was flown here by helicopter. Her diltiazem was held, though she progressed to complete heart block and so a permanent pacemaker was implanted since she showed that she had intrinsic cardiac electrical disease as the culprit (the heart block should have improved if it was due to diltiazem). Her sharp chest pain had resolved within 24 hours of admission and was not thought to be due to angina. Possibly musculoskeletal.    TODAY:  She denies syncope, dizziness, palpitations, SOB at rest.  No chest pain.       Past Medical History:   Diagnosis Date    Hypertension     MVP (mitral valve prolapse)       Past Surgical History:   Procedure Laterality Date    HX COLONOSCOPY       Allergies   Allergen Reactions    Verapamil Unknown (comments)      Meds:  See below  Social History   Substance Use Topics    Smoking status: Never Smoker    Smokeless tobacco: Never Used    Alcohol use No      Family History   Problem Relation Age of Onset    No Known Problems Mother        REVIEW OF SYSTEMS:    Constitutional: negative fever, negative chills, negative weight loss  Respiratory:  negative cough, negative dyspnea  Genitourinary: negative for frequency, dysuria  Neurological:  negative for headaches, dizziness, vertigo, weakness    Objective:      Physical Exam:    Last 24hrs VS reviewed since prior progress note. Most recent are:    Visit Vitals    /77 (BP 1 Location: Right arm, BP Patient Position: At rest)    Pulse 70    Temp 97.6 °F (36.4 °C)    Resp 20    Ht 5' 3\" (1.6 m)    Wt 56.6 kg (124 lb 12.5 oz)    SpO2 96%    BMI 22.1 kg/m2       Intake/Output Summary (Last 24 hours) at 05/17/17 1514  Last data filed at 05/17/17 1400   Gross per 24 hour   Intake              260 ml   Output             1075 ml   Net             -815 ml        General Appearance: Well developed, well nourished, alert & oriented x 3,    no acute distress. Ears/Nose/Mouth/Throat: Pupils equal and round, Hearing grossly normal.  Neck:  Supple. JVP within normal limits. Carotids good upstrokes, with no bruit. Chest: Lungs clear to auscultation bilaterally. Symmetric air movement. Unlabored. L chest pacer site OK. Cardiovascular: Regular rate and rhythm, bradycardic, S1S2 normal, late systolic murmur +/- early click  Abdomen: Soft, non-tender, bowel sounds are active. No organomegaly. Extremities: No edema bilaterally. Distal Pulses +1. Skin: Warm and dry. Neuro: CN II-XII grossly intact, Strength and sensation grossly intact. []         Post-cath site without hematoma, bruit, tenderness, or thrill. Distal pulses intact. Data:      ECHO here:  LEFT VENTRICLE: Size was normal. Systolic function was normal. Ejection  fraction was estimated in the range of 65 % to 70 %. No obvious wall  motion abnormalities identified in the views obtained. Wall thickness was  normal.    VENTRICULAR SEPTUM: There was basal septal prominence.     RIGHT VENTRICLE: The ventricle was mildly dilated. Systolic function was  normal.    LEFT ATRIUM: Size was normal.    RIGHT ATRIUM: The atrium was mildly dilated. MITRAL VALVE: Normal valve structure. There was mild thickening of the  anterior and posterior leaflets. There was normal leaflet separation. DOPPLER: The transmitral velocity was within the normal range. There was  no evidence for stenosis. There was trivial regurgitation. AORTIC VALVE: The valve was trileaflet. Leaflets exhibited mildly  increased thickness and mildly reduced cuspal separation. DOPPLER: There  was mild stenosis. There was mild regurgitation. TRICUSPID VALVE: Normal valve structure. There was normal leaflet  separation. DOPPLER: The transtricuspid velocity was within the normal  range. There was no evidence for tricuspid stenosis. There was severe  regurgitation. Pulmonary artery systolic pressure: 43 mmHg. PULMONIC VALVE: Not well visualized, but normal Doppler findings. DOPPLER:  There was trivial regurgitation. AORTA: The root exhibited normal size. PERICARDIUM: Insignificant pericardial effusion and/or pericardial fat was  present. Prior to Admission medications    Medication Sig Start Date End Date Taking? Authorizing Provider   levothyroxine (SYNTHROID) 25 mcg tablet Take 1 Tab by mouth Daily (before breakfast). 5/16/17  Yes Jayne Vu MD   dilTIAZem CD (CARDIZEM CD) 300 mg ER capsule Take 1 Cap by mouth daily. 5/16/17  Yes Jayne Vu MD   citalopram (CELEXA) 20 mg tablet Take  by mouth as needed. Yes Historical Provider   cholecalciferol, vitamin D3, (VITAMIN D3) 2,000 unit tab Take  by mouth. Yes Historical Provider   vitamin e (AQUA GEMS) 200 unit capsule Take  by mouth daily. Yes Historical Provider   calcium-cholecalciferol, D3, (CALTRATE 600+D) tablet Take 1 Tab by mouth daily. Historical Provider   cyanocobalamin (VITAMIN B-12) 500 mcg tablet Take 500 mcg by mouth daily.     Historical Provider hydrochlorothiazide (HYDRODIURIL) 25 mg tablet Take 25 mg by mouth daily. Historical Provider       Recent Results (from the past 24 hour(s))   CBC WITH AUTOMATED DIFF    Collection Time: 05/17/17  4:28 AM   Result Value Ref Range    WBC 6.2 3.6 - 11.0 K/uL    RBC 4.48 3.80 - 5.20 M/uL    HGB 13.7 11.5 - 16.0 g/dL    HCT 40.4 35.0 - 47.0 %    MCV 90.2 80.0 - 99.0 FL    MCH 30.6 26.0 - 34.0 PG    MCHC 33.9 30.0 - 36.5 g/dL    RDW 13.1 11.5 - 14.5 %    PLATELET 871 200 - 289 K/uL    NEUTROPHILS 53 32 - 75 %    LYMPHOCYTES 35 12 - 49 %    MONOCYTES 9 5 - 13 %    EOSINOPHILS 2 0 - 7 %    BASOPHILS 1 0 - 1 %    ABS. NEUTROPHILS 3.3 1.8 - 8.0 K/UL    ABS. LYMPHOCYTES 2.2 0.8 - 3.5 K/UL    ABS. MONOCYTES 0.6 0.0 - 1.0 K/UL    ABS. EOSINOPHILS 0.1 0.0 - 0.4 K/UL    ABS.  BASOPHILS 0.0 0.0 - 0.1 K/UL   METABOLIC PANEL, BASIC    Collection Time: 05/17/17  4:28 AM   Result Value Ref Range    Sodium 138 136 - 145 mmol/L    Potassium 3.6 3.5 - 5.1 mmol/L    Chloride 103 97 - 108 mmol/L    CO2 25 21 - 32 mmol/L    Anion gap 10 5 - 15 mmol/L    Glucose 72 65 - 100 mg/dL    BUN 24 (H) 6 - 20 MG/DL    Creatinine 0.94 0.55 - 1.02 MG/DL    BUN/Creatinine ratio 26 (H) 12 - 20      GFR est AA >60 >60 ml/min/1.73m2    GFR est non-AA 57 (L) >60 ml/min/1.73m2    Calcium 8.8 8.5 - 10.1 MG/DL         Saritha Law MD

## 2017-05-17 NOTE — PROGRESS NOTES
The diltiazem was discontinued on admission, so it had to be re-prescribed on discharge. This is NOT a new medication for her and she is to take it indefinitely moving forward. No longer a factor regarding AV conduction disease given the new pacemaker. Won't switch to another antihypertensive since she seemed to tolerate this well otherwise.

## 2017-05-17 NOTE — PROGRESS NOTES
Bedside and Verbal shift change report given to Netty Riedel (oncoming nurse) by Fred Clarke (offgoing nurse). Report included the following information SBAR, Kardex, ED Summary and Recent Results.

## 2017-05-17 NOTE — DISCHARGE INSTRUCTIONS
DISCHARGE INSTRUCTIONS FOR PATIENTS WITH PACEMAKERS    You had a permanent pacemaker inserted on 5/16 with Dr. Bartolo Barrientos due to a slow heart rate problem called complete heart block. The diltiazem was discontinued on admission, so it had to be re-prescribed on discharge. This is NOT a new medication for you. Take it indefinitely moving forward. Won't switch to another blood pressure medication since you seemed to tolerate this well and no longer concerned about slowing the heart rate with the pacemaker in place. 1. Remember to call for an appointment in 2-4 weeks [phone] to check healing and implant programming with Dr. Zarina Phillips nurse. 2. Medic Alert Bracelets are available from your pharmacist to wear at all times if you choose to wear one. 3. Carry your ID card for pacemaker with you at all times. This card will be given to you in the hospital or mailed to you. 4. The pacemaker will bulge slightly under your skin. The bulge will decrease in size over the next few weeks. Please notify the doctor's office if you notice any of the following around your site:   A.  A bruise that does not go away. B.  Soreness or yellow, green, or brown drainage from the site. C. Any swelling from the site. D. If you have a fever of 100 degrees or higher that lasts for a few days. INCISION CARE       1.  Leave skin glue over your site until it starts to fall off, usually in a few weeks. 2.  You may shower after 3 days as long as your incision isnt submerged or directly sprayed upon until well healed. 3.  For comfort, wear loose fitting clothing. 4.  Report any signs of infection, fever, pain, swelling, redness, oozing, or heat at site especially if these symptoms increase after the first 3 to 4 days. ACTIVITY PRECAUTIONS     1. Avoid rough contact with the implant site. 2. No driving for 14 days.   3. Avoid lifting your arm over your head, carrying anything on the affected side, or lifting over 10 pounds for 30 days. For the first 2 days only bend your arm at the elbow. 4. Any extreme activity such as golf, weight lifting or exercise biking should be restricted for 60 days. 5. Do not carry objects by holding them against your implant site. 6.  No shooting rifles or any type of gun with the affected shoulder permanently. SPECIAL PRECAUTIONS     1. You should avoid all strong magnetic fields, such as arc welding, large transformers, large motors. 2.  You may not have an MRI which uses a strong magnet to take pictures. 3.  Treatments or surgery that requires diathermy or electrocautery should be discussed with your doctor before scheduled. 4. Avoid radio frequency transmitters, including radar. 5. Advise dentist or other medical personnel you see that you have a pacemaker. 6.  Cell phones and microwave oven use is okay. 7.  If you plan to move or take a trip to a new area, the doctor's office will give you a name of a doctor to contact for any problems. ANTIBIOTIC THERAPY    During the first 8 weeks after your pacemaker insertion, you may need antibiotics before any dental work or certain tests or operations. Let the dentist or doctor who is caring for you know that you have had an implanted device.

## 2017-06-07 ENCOUNTER — OFFICE VISIT (OUTPATIENT)
Dept: ENDOCRINOLOGY | Age: 82
End: 2017-06-07

## 2017-06-07 VITALS
SYSTOLIC BLOOD PRESSURE: 140 MMHG | WEIGHT: 116.4 LBS | BODY MASS INDEX: 20.62 KG/M2 | DIASTOLIC BLOOD PRESSURE: 70 MMHG | HEIGHT: 63 IN | HEART RATE: 75 BPM

## 2017-06-07 DIAGNOSIS — R79.89 ABNORMAL THYROID BLOOD TEST: Primary | ICD-10-CM

## 2017-06-07 DIAGNOSIS — R14.2 BELCHING: ICD-10-CM

## 2017-06-07 NOTE — PATIENT INSTRUCTIONS
Simethicone (Gas-X) You can get this over the counter and take as needed for gas issues. Gas and Bloating: Care Instructions  Your Care Instructions  Gas and bloating can be uncomfortable and embarrassing problems. All people pass gas, but some people produce more gas than others, sometimes enough to cause distress. It is normal to pass gas from 6 to 20 times per day. Excess gas usually is not caused by a serious health problem. Gas and bloating usually are caused by something you eat or drink, including some food supplements and medicines. Gas and bloating are usually harmless and go away without treatment. However, changing your diet can help end the problem. Some over-the-counter medicines can help prevent gas and relieve bloating. Follow-up care is a key part of your treatment and safety. Be sure to make and go to all appointments, and call your doctor if you are having problems. It's also a good idea to know your test results and keep a list of the medicines you take. How can you care for yourself at home? · Keep a food diary if you think a food gives you gas. Write down what you eat or drink. Also record when you get gas. If you notice that a food seems to cause your gas each time, avoid it and see if the gas goes away. Examples of foods that cause gas include:  ¨ Fried and fatty foods. ¨ Beans. ¨ Vegetables such as artichokes, asparagus, broccoli, brussels sprouts, cabbage, cauliflower, cucumbers, green peppers, onions, peas, radishes, and raw potatoes. ¨ Fruits such as apricots, bananas, melons, peaches, pears, prunes, and raw apples. ¨ Wheat and wheat bran. · Soak dry beans in water overnight, then dump the water and cook the soaked beans in new water. This can help prevent gas and bloating. · If you have problems with lactose, avoid dairy products such as milk and cheese. · Try not to swallow air. Do not drink through a straw, gulp your food, or chew gum.   · Take an over-the-counter medicine. Read and follow all instructions on the label. ¨ Food enzymes, such as Beano, can be added to gas-producing foods to prevent gas. ¨ Antacids, such as Maalox Anti-Gas and Mylanta Gas, can relieve bloating by making you burp. Be careful when you take over-the-counter antacid medicines. Many of these medicines have aspirin in them. Read the label to make sure that you are not taking more than the recommended dose. Too much aspirin can be harmful. ¨ Activated charcoal tablets, such as CharcoCaps, may decrease odor from gas you pass. ¨ If you have problems with lactose, you can take medicines such as Dairy Ease and Lactaid with dairy products to prevent gas and bloating. · Get some exercise regularly. When should you call for help? Call 911 anytime you think you may need emergency care. For example, call if:  · You have gas and signs of a heart attack, such as:  ¨ Chest pain or pressure. ¨ Sweating. ¨ Shortness of breath. ¨ Nausea or vomiting. ¨ Pain that spreads from the chest to the neck, jaw, or one or both shoulders or arms. ¨ Dizziness or lightheadedness. ¨ A fast or uneven pulse. After calling 911, chew 1 adult-strength aspirin. Wait for an ambulance. Do not try to drive yourself. Call your doctor now or seek immediate medical care if:  · You have severe belly pain. · You have blood in your stool. Watch closely for changes in your health, and be sure to contact your doctor if:  · You have blood or pus in your urine. · Your urine is cloudy or smells bad. · You are burping and have trouble swallowing. · You feel bloated and have swelling in your belly. · You do not get better as expected. Where can you learn more? Go to http://dk-yuniel.info/. Enter S774 in the search box to learn more about \"Gas and Bloating: Care Instructions. \"  Current as of: November 16, 2016  Content Version: 11.2  © 6005-8258 [employer], [employer].  Care instructions adapted under license by [address] (which disclaims liability or warranty for this information). If you have questions about a medical condition or this instruction, always ask your healthcare professional. [address] any warranty or liability for your use of this information.

## 2017-06-07 NOTE — PROGRESS NOTES
Chief Complaint   Patient presents with    Thyroid Problem     pcp and pharmacy verified   Records reviewed  History of Present Illness: Lydia Cheung is a 80 y.o. female here for follow up of abnormal thyroid labs. Ms Rere Lopez was having issues of Chest pain in May, with elevated SBP, she was airlifted to [facility] and was found to be in severe heart block. Pt has hx of SVT and was on Diltiazem. She was taken off the Diltiazem and placed on Norvasc. Her BPs improved but she remained in heart block, she eventually required placement of a Pacemaker and was discharged home on Diltiazem. As part of the work-up for heart block she had a TSH drawn that was 5.9, a repeat test came back at 6.9. Her Free T4 was in a normal range at 1.2. Pt has no hx of thyroid issues (hyperthyroid or hypothyroidism), she denies hx of any neck or thyroid surgeries or any hx of Head or Neck radiation therapy or any known radiation exposures. I recommended not starting her on LT4 and instead following up with me as an OP to re-evaluate her thyroid function once she had time to recover from her acute illness. She notes she is recovering well from the pacemaker placement. She denies issues of palpitations, CP, SOB, tremors, heat or cold intolerance. She notes her constipation is improved with metamucil. She denies issues of dysphagia, dysphonia or chocking issues. She does note she has been having issues of gas and belching. She denies pain with the gas and belching. She denies issues of GERD, N/V, or abdominal pain. Current Outpatient Prescriptions   Medication Sig    dilTIAZem CD (CARDIZEM CD) 300 mg ER capsule Take 1 Cap by mouth daily.  citalopram (CELEXA) 20 mg tablet Take  by mouth as needed.  calcium-cholecalciferol, D3, (CALTRATE 600+D) tablet Take 1 Tab by mouth daily.  cholecalciferol, vitamin D3, (VITAMIN D3) 2,000 unit tab Take  by mouth.     cyanocobalamin (VITAMIN B-12) 500 mcg tablet Take 500 mcg by mouth daily.    vitamin e (AQUA GEMS) 200 unit capsule Take  by mouth daily. No current facility-administered medications for this visit. Allergies   Allergen Reactions    Verapamil Unknown (comments)     Review of Systems:  - Cardiovascular: no chest pain  - Neurological: no tremors  - Integumentary: skin is normal    Physical Examination:  Blood pressure 140/70, pulse 75, height 5' 3\" (1.6 m), weight 116 lb 6.4 oz (52.8 kg). - General: pleasant, no distress, good eye contact   - Neck: no thyromegaly or thyroid bruits  - Cardiovascular: regular, normal rate, nl s1 and s2, no m/r/g   - Integumentary: skin is normal, no edema  - Neurological: reflexes 2+ at biceps, no tremors  - Psychiatric: normal mood and affect    Data Reviewed:   - none new for review    Assessment/Plan:   1) Abnormal TFTs > Pt is clinically euthyroid, will repeat her TFTs now that she is out of the hospital to see if they are still abnormal. As long as her TSH is under 10 I am not concerned with a mild elevation in TSH with normal T4 as TSH tends to increase with age. 2) Gas and belching (new to me) > Pt reports issues of belching and gas. It is not painful and other than a social issues it is not causing any distress or issues. I offered her Simethicone but she would like to avoid taking any medications if possible. I explained she could get this over the counter and take as needed, up to 4 times per day. Pt voices understanding and agreement with the plan. Patient Instructions   Simethicone (Gas-X) You can get this over the counter and take as needed for gas issues. Gas and Bloating: Care Instructions  Your Care Instructions  Gas and bloating can be uncomfortable and embarrassing problems. All people pass gas, but some people produce more gas than others, sometimes enough to cause distress. It is normal to pass gas from 6 to 20 times per day. Excess gas usually is not caused by a serious health problem.   Gas and bloating usually are caused by something you eat or drink, including some food supplements and medicines. Gas and bloating are usually harmless and go away without treatment. However, changing your diet can help end the problem. Some over-the-counter medicines can help prevent gas and relieve bloating. Follow-up care is a key part of your treatment and safety. Be sure to make and go to all appointments, and call your doctor if you are having problems. It's also a good idea to know your test results and keep a list of the medicines you take. How can you care for yourself at home? · Keep a food diary if you think a food gives you gas. Write down what you eat or drink. Also record when you get gas. If you notice that a food seems to cause your gas each time, avoid it and see if the gas goes away. Examples of foods that cause gas include:  ¨ Fried and fatty foods. ¨ Beans. ¨ Vegetables such as artichokes, asparagus, broccoli, brussels sprouts, cabbage, cauliflower, cucumbers, green peppers, onions, peas, radishes, and raw potatoes. ¨ Fruits such as apricots, bananas, melons, peaches, pears, prunes, and raw apples. ¨ Wheat and wheat bran. · Soak dry beans in water overnight, then dump the water and cook the soaked beans in new water. This can help prevent gas and bloating. · If you have problems with lactose, avoid dairy products such as milk and cheese. · Try not to swallow air. Do not drink through a straw, gulp your food, or chew gum. · Take an over-the-counter medicine. Read and follow all instructions on the label. ¨ Food enzymes, such as Beano, can be added to gas-producing foods to prevent gas. ¨ Antacids, such as Maalox Anti-Gas and Mylanta Gas, can relieve bloating by making you burp. Be careful when you take over-the-counter antacid medicines. Many of these medicines have aspirin in them. Read the label to make sure that you are not taking more than the recommended dose.  Too much aspirin can be harmful. ¨ Activated charcoal tablets, such as CharcoCaps, may decrease odor from gas you pass. ¨ If you have problems with lactose, you can take medicines such as Dairy Ease and Lactaid with dairy products to prevent gas and bloating. · Get some exercise regularly. When should you call for help? Call 911 anytime you think you may need emergency care. For example, call if:  · You have gas and signs of a heart attack, such as:  ¨ Chest pain or pressure. ¨ Sweating. ¨ Shortness of breath. ¨ Nausea or vomiting. ¨ Pain that spreads from the chest to the neck, jaw, or one or both shoulders or arms. ¨ Dizziness or lightheadedness. ¨ A fast or uneven pulse. After calling 911, chew 1 adult-strength aspirin. Wait for an ambulance. Do not try to drive yourself. Call your doctor now or seek immediate medical care if:  · You have severe belly pain. · You have blood in your stool. Watch closely for changes in your health, and be sure to contact your doctor if:  · You have blood or pus in your urine. · Your urine is cloudy or smells bad. · You are burping and have trouble swallowing. · You feel bloated and have swelling in your belly. · You do not get better as expected. Where can you learn more? Go to http://dk-yuniel.info/. Enter I189 in the search box to learn more about \"Gas and Bloating: Care Instructions. \"  Current as of: November 16, 2016  Content Version: 11.2  © 7265-6130 [employer]. Care instructions adapted under license by [employer] (which disclaims liability or warranty for this information). If you have questions about a medical condition or this instruction, always ask your healthcare professional. [place] [address] any warranty or liability for your use of this information. Follow-up Disposition:  Return based on the results of the above findings. Copy sent to:  Paras Buenrostro and Simin Jesus.

## 2017-06-07 NOTE — MR AVS SNAPSHOT
Visit Information Date & Time Provider Department Dept. Phone Encounter #  
 6/7/2017  9:30 AM Zina Carroll, [address] [place] [address] Diabetes and Endocrinology [phone] [id number] Follow-up Instructions Return based on the results of the above findings. Upcoming Health Maintenance Date Due DTaP/Tdap/Td series (1 - Tdap) 2/11/1956 ZOSTER VACCINE AGE 60> 2/11/1995 GLAUCOMA SCREENING Q2Y 2/11/2000 OSTEOPOROSIS SCREENING (DEXA) 2/11/2000 Pneumococcal 65+ Low/Medium Risk (1 of 2 - PCV13) 2/11/2000 MEDICARE YEARLY EXAM 2/11/2000 INFLUENZA AGE 9 TO ADULT 8/1/2017 Allergies as of 6/7/2017  Review Complete On: 6/7/2017 By: Zina Carroll MD  
  
 Severity Noted Reaction Type Reactions Verapamil  01/30/2014    Unknown (comments) Current Immunizations  Never Reviewed No immunizations on file. Not reviewed this visit You Were Diagnosed With   
  
 Codes Comments Abnormal thyroid blood test    -  Primary ICD-10-CM: R94.6 ICD-9-CM: 794.5 Vitals BP Pulse Height(growth percentile) Weight(growth percentile) BMI OB Status 140/70 (BP 1 Location: Right arm, BP Patient Position: Sitting) 75 5' 3\" (1.6 m) 116 lb 6.4 oz (52.8 kg) 20.62 kg/m2 Postmenopausal  
 Smoking Status Never Smoker Vitals History BMI and BSA Data Body Mass Index Body Surface Area  
 20.62 kg/m 2 1.53 m 2 Preferred Pharmacy Pharmacy Name Phone [facility] [phone] Your Updated Medication List  
  
   
This list is accurate as of: 6/7/17  9:56 AM.  Always use your most recent med list.  
  
  
  
  
 calcium-cholecalciferol (D3) tablet Commonly known as:  CALTRATE 600+D Take 1 Tab by mouth daily. citalopram 20 mg tablet Commonly known as:  [address] Take  by mouth as needed. dilTIAZem  mg ER capsule Commonly known as:  CARDIZEM CD  
 Take 1 Cap by mouth daily. VITAMIN B-12 500 mcg tablet Generic drug:  cyanocobalamin Take 500 mcg by mouth daily. VITAMIN D3 2,000 unit Tab Generic drug:  cholecalciferol (vitamin D3) Take  by mouth.  
  
 vitamin e 200 unit capsule Commonly known as:  [address] Take  by mouth daily. We Performed the Following FL COLLECTION VENOUS BLOOD,VENIPUNCTURE [id number] CPT(R)] FL HANDLG&/OR CONVEY OF SPEC FOR TR OFFICE TO LAB [85598 CPT(R)]   
 T3 TOTAL [50767 CPT(R)] T4, FREE [id number] CPT(R)] THYROID ANTIBODY PANEL [95298 CPT(R)] TSH 3RD GENERATION [47569 CPT(R)] Follow-up Instructions Return based on the results of the above findings. Patient Instructions Simethicone (Gas-X) You can get this over the counter and take as needed for gas issues. Gas and Bloating: Care Instructions Your Care Instructions Gas and bloating can be uncomfortable and embarrassing problems. All people pass gas, but some people produce more gas than others, sometimes enough to cause distress. It is normal to pass gas from 6 to 20 times per day. Excess gas usually is not caused by a serious health problem. Gas and bloating usually are caused by something you eat or drink, including some food supplements and medicines. Gas and bloating are usually harmless and go away without treatment. However, changing your diet can help end the problem. Some over-the-counter medicines can help prevent gas and relieve bloating. Follow-up care is a key part of your treatment and safety. Be sure to make and go to all appointments, and call your doctor if you are having problems. It's also a good idea to know your test results and keep a list of the medicines you take. How can you care for yourself at home? · Keep a food diary if you think a food gives you gas. Write down what you eat or drink. Also record when you get gas.  If you notice that a food seems to cause your gas each time, avoid it and see if the gas goes away. Examples of foods that cause gas include: ¨ Fried and fatty foods. ¨ Beans. ¨ Vegetables such as artichokes, asparagus, broccoli, brussels sprouts, cabbage, cauliflower, cucumbers, green peppers, onions, peas, radishes, and raw potatoes. ¨ Fruits such as apricots, bananas, melons, peaches, pears, prunes, and raw apples. ¨ Wheat and wheat bran. · Soak dry beans in water overnight, then dump the water and cook the soaked beans in new water. This can help prevent gas and bloating. · If you have problems with lactose, avoid dairy products such as milk and cheese. · Try not to swallow air. Do not drink through a straw, gulp your food, or chew gum. · Take an over-the-counter medicine. Read and follow all instructions on the label. ¨ Food enzymes, such as Beano, can be added to gas-producing foods to prevent gas. ¨ Antacids, such as Maalox Anti-Gas and Mylanta Gas, can relieve bloating by making you burp. Be careful when you take over-the-counter antacid medicines. Many of these medicines have aspirin in them. Read the label to make sure that you are not taking more than the recommended dose. Too much aspirin can be harmful. ¨ Activated charcoal tablets, such as CharcoCaps, may decrease odor from gas you pass. ¨ If you have problems with lactose, you can take medicines such as Dairy Ease and Lactaid with dairy products to prevent gas and bloating. · Get some exercise regularly. When should you call for help? Call 911 anytime you think you may need emergency care. For example, call if: 
· You have gas and signs of a heart attack, such as: ¨ Chest pain or pressure. ¨ Sweating. ¨ Shortness of breath. ¨ Nausea or vomiting. ¨ Pain that spreads from the chest to the neck, jaw, or one or both shoulders or arms. ¨ Dizziness or lightheadedness. ¨ A fast or uneven pulse. After calling 911, chew 1 adult-strength aspirin. Wait for an ambulance. Do not try to drive yourself. Call your doctor now or seek immediate medical care if: 
· You have severe belly pain. · You have blood in your stool. Watch closely for changes in your health, and be sure to contact your doctor if: 
· You have blood or pus in your urine. · Your urine is cloudy or smells bad. · You are burping and have trouble swallowing. · You feel bloated and have swelling in your belly. · You do not get better as expected. Where can you learn more? Go to http://dk-yuniel.info/. Enter P078 in the search box to learn more about \"Gas and Bloating: Care Instructions. \" Current as of: November 16, 2016 Content Version: 11.2 © 6074-7599 [employer]. Care instructions adapted under license by [employer] (which disclaims liability or warranty for this information). If you have questions about a medical condition or this instruction, always ask your healthcare professional. [address] any warranty or liability for your use of this information. Introducing \[facility]\"" & HEALTH SERVICES! Byron Lizzully introduces Roomlr patient portal. Now you can access parts of your medical record, email your doctor's office, and request medication refills online. 1. In your internet browser, go to [email or web address] 2. Click on the First Time User? Click Here link in the Sign In box. You will see the New Member Sign Up page. 3. Enter your Roomlr Access Code exactly as it appears below. You will not need to use this code after youve completed the sign-up process. If you do not sign up before the expiration date, you must request a new code. · Roomlr Access Code: [email or web address] Expires: 8/15/2017  5:57 PM 
 
4.  Enter the last four digits of your Social Security Number (xxxx) and Date of Birth (mm/dd/yyyy) as indicated and click Submit. You will be taken to the next sign-up page. 5. Create a Bix ID. This will be your Bix login ID and cannot be changed, so think of one that is secure and easy to remember. 6. Create a Bix password. You can change your password at any time. 7. Enter your Password Reset Question and Answer. This can be used at a later time if you forget your password. 8. Enter your e-mail address. You will receive e-mail notification when new information is available in [address] 9. Click Sign Up. You can now view and download portions of your medical record. 10. Click the Download Summary menu link to download a portable copy of your medical information. If you have questions, please visit the Frequently Asked Questions section of the Bix website. Remember, Bix is NOT to be used for urgent needs. For medical emergencies, dial 911. Now available from your iPhone and Android! Please provide this summary of care documentation to your next provider. Your primary care clinician is listed as Brittni Buenrostro. If you have any questions after today's visit, please call [phone].

## 2017-06-08 LAB
T3 SERPL-MCNC: 118 NG/DL (ref 71–180)
T4 FREE SERPL-MCNC: 1.19 NG/DL (ref 0.82–1.77)
THYROGLOB AB SERPL-ACNC: <1 IU/ML (ref 0–0.9)
THYROPEROXIDASE AB SERPL-ACNC: 204 IU/ML (ref 0–34)
TSH SERPL DL<=0.005 MIU/L-ACNC: 4.9 UIU/ML (ref 0.45–4.5)

## 2017-06-19 ENCOUNTER — TELEPHONE (OUTPATIENT)
Dept: ENDOCRINOLOGY | Age: 82
End: 2017-06-19

## 2017-06-19 NOTE — PROGRESS NOTES
Called pt regarding her thyroid labs. Her TSH has come down to 4.900, the T3 and T4 were in a normal range. Studies have shown that a TSH in the 4-6 range is normal for someone in their 80's. With the TSH improved and with the T3/T4 in a normal range I feel her thyroid is functioning well with no problems. No treatment or further work-up needed.

## 2017-06-19 NOTE — TELEPHONE ENCOUNTER
Patient is requesting a call back in regards to her lab results. She can be reached at [phone].

## 2017-08-07 ENCOUNTER — OFFICE VISIT (OUTPATIENT)
Dept: FAMILY MEDICINE CLINIC | Age: 82
End: 2017-08-07

## 2017-08-07 VITALS
TEMPERATURE: 97.2 F | OXYGEN SATURATION: 96 % | DIASTOLIC BLOOD PRESSURE: 72 MMHG | WEIGHT: 120.2 LBS | BODY MASS INDEX: 21.29 KG/M2 | SYSTOLIC BLOOD PRESSURE: 140 MMHG | HEART RATE: 80 BPM | RESPIRATION RATE: 16 BRPM

## 2017-08-07 DIAGNOSIS — Z78.0 POSTMENOPAUSAL: ICD-10-CM

## 2017-08-07 DIAGNOSIS — H61.21 CERUMEN DEBRIS ON TYMPANIC MEMBRANE, RIGHT: ICD-10-CM

## 2017-08-07 DIAGNOSIS — L57.0 AK (ACTINIC KERATOSIS): ICD-10-CM

## 2017-08-07 DIAGNOSIS — Z23 ENCOUNTER FOR IMMUNIZATION: ICD-10-CM

## 2017-08-07 DIAGNOSIS — Z00.00 MEDICARE ANNUAL WELLNESS VISIT, SUBSEQUENT: Primary | ICD-10-CM

## 2017-08-07 NOTE — PROGRESS NOTES
HISTORY OF PRESENT ILLNESS  Nisa Sosa is a 80 y.o. female. Chief Complaint   Patient presents with    Annual Wellness Visit            HPI  Right face irritating     Skin lesion on nose BCC? No Hx of BCC    feet swell when riding a long time in car     syst    Review of Systems   Constitutional: Negative for fever and weight loss. HENT: Negative for congestion and hearing loss. Eyes: Negative for blurred vision. Respiratory: Negative for cough. Cardiovascular: Negative for chest pain. Gastrointestinal: Negative for abdominal pain and blood in stool. Genitourinary: Negative for frequency and hematuria. Musculoskeletal: Negative for joint pain and myalgias. Skin:        Skin lesion on nose   Neurological: Negative for dizziness. Endo/Heme/Allergies: Negative for polydipsia. Psychiatric/Behavioral: Negative for depression. The patient is not nervous/anxious. Past Medical History:   Diagnosis Date    Hypertension     MVP (mitral valve prolapse)      Past Surgical History:   Procedure Laterality Date    HX COLONOSCOPY      HX PACEMAKER  05/2017     Current Outpatient Prescriptions   Medication Sig Dispense Refill    dilTIAZem CD (CARDIZEM CD) 300 mg ER capsule Take 1 Cap by mouth daily. 30 Cap 0    citalopram (CELEXA) 20 mg tablet Take  by mouth as needed.  calcium-cholecalciferol, D3, (CALTRATE 600+D) tablet Take 1 Tab by mouth daily.  cholecalciferol, vitamin D3, (VITAMIN D3) 2,000 unit tab Take  by mouth.  cyanocobalamin (VITAMIN B-12) 500 mcg tablet Take 500 mcg by mouth daily.  vitamin e (AQUA GEMS) 200 unit capsule Take  by mouth daily.        Allergies   Allergen Reactions    Verapamil Unknown (comments)     Visit Vitals    /72 (BP 1 Location: Right arm, BP Patient Position: Sitting)    Pulse 80    Temp 97.2 °F (36.2 °C) (Oral)    Resp 16    Wt 120 lb 3.2 oz (54.5 kg)    SpO2 96%    BMI 21.29 kg/m2     Physical Exam Constitutional: She is oriented to person, place, and time. She appears well-developed and well-nourished. No distress. HENT:   Head: Normocephalic and atraumatic. Left Ear: External ear normal.   Mouth/Throat: Oropharynx is clear and moist. No oropharyngeal exudate. Right Cerumen   Eyes: Conjunctivae are normal. Pupils are equal, round, and reactive to light. Cardiovascular: Normal rate, regular rhythm, normal heart sounds and intact distal pulses. Pulmonary/Chest: Effort normal and breath sounds normal.   Abdominal: Soft. She exhibits no distension. There is no tenderness. Musculoskeletal: She exhibits no edema. Lymphadenopathy:     She has no cervical adenopathy. Neurological: She is alert and oriented to person, place, and time. Skin:   Red rough lesion on root of nose sim to AK   Nursing note and vitals reviewed. Liquid Nitrogen treatment of skin leison on nose x 3    ASSESSMENT and PLAN    ICD-10-CM ICD-9-CM    1. Medicare annual wellness visit, subsequent Z00.00 V70.0    2. Postmenopausal Z78.0 V49.81 DEXA BONE DENSITY STUDY AXIAL   3. Encounter for immunization Z23 V03.89 PNEUMOCOCCAL CONJ VACCINE 13 VALENT IM   4. AK (actinic keratosis) L57.0 702.0 DESTRUC PREMALIGNANT, FIRST LESION   5.  Cerumen debris on tympanic membrane, right H61.21 380.4    advised pt to try oil in right ear and let it drain  If not better or worse RTC for ear lavage  RTC if skin lesion returns

## 2017-08-07 NOTE — ACP (ADVANCE CARE PLANNING)
Advance Care Planning (ACP) Provider Conversation Snapshot    Date of ACP Conversation: 08/07/17  Persons included in Conversation:  patient  Length of ACP Conversation in minutes:  <16 minutes (Non-Billable)    HO given and pt will review and bring us a copy

## 2017-08-07 NOTE — PROGRESS NOTES
Chief Complaint   Patient presents with   [facility] Annual Wellness Visit            Mannie Abbasi is a 80 y.o. female and presents for annual Medicare Wellness Visit. Problem List: Reviewed with patient and discussed risk factors. 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       Current medical providers:  Patient Care Team:  Carol Toro MD as PCP - General (Family Practice)  Kamlesh Jain MD (Surgery)    PSH: Reviewed with patient  Past Surgical History:   Procedure Laterality Date    HX COLONOSCOPY      HX PACEMAKER  05/2017        SH: Reviewed with patient  Social History   Substance Use Topics    Smoking status: Never Smoker    Smokeless tobacco: Never Used    Alcohol use No       FH: Reviewed with patient  Family History   Problem Relation Age of Onset    No Known Problems Mother     Stroke Father        Medications/Allergies: Reviewed with patient  Current Outpatient Prescriptions on File Prior to Visit   Medication Sig Dispense Refill    dilTIAZem CD (CARDIZEM CD) 300 mg ER capsule Take 1 Cap by mouth daily. 30 Cap 0    citalopram (CELEXA) 20 mg tablet Take  by mouth as needed.  calcium-cholecalciferol, D3, (CALTRATE 600+D) tablet Take 1 Tab by mouth daily.  cholecalciferol, vitamin D3, (VITAMIN D3) 2,000 unit tab Take  by mouth.  cyanocobalamin (VITAMIN B-12) 500 mcg tablet Take 500 mcg by mouth daily.  vitamin e (AQUA GEMS) 200 unit capsule Take  by mouth daily. No current facility-administered medications on file prior to visit. Allergies   Allergen Reactions    Verapamil Unknown (comments)       Objective:  Visit Vitals    /73 (BP 1 Location: Right arm, BP Patient Position: Sitting)    Pulse 80    Temp 97.2 °F (36.2 °C) (Oral)    Resp 16    Wt 120 lb 3.2 oz (54.5 kg)    SpO2 96%    BMI 21.29 kg/m2    Body mass index is 21.29 kg/(m^2).     Assessment of cognitive impairment: Alert and oriented x 3    Depression Screen:   PHQ over the last two weeks 8/7/2017   Little interest or pleasure in doing things Not at all   Feeling down, depressed or hopeless Not at all   Total Score PHQ 2 0       Fall Risk Assessment:    Fall Risk Assessment, last 12 mths 8/7/2017   Able to walk? Yes   Fall in past 12 months? No       Functional Ability:   Does the patient exhibit a steady gait? yes   How long did it take the patient to get up and walk from a sitting position? 3sec   Is the patient self reliant?  (ie can do own laundry, meals, household chores)  yes     Does the patient handle his/her own medications? yes     Does the patient handle his/her own money? yes     Is the patients home safe (ie good lighting, handrails on stairs and bath, etc.)? yes     Did you notice or did patient express any hearing difficulties? no     Did you notice or did patient express any vision difficulties?   no     Were distance and reading eye charts used? no       Advance Care Planning:   Patient was offered the opportunity to discuss advance care planning:  yes     Does patient have an Advance Directive:  no   If no, did you provide information on Caring Connections? yes       Plan:      No orders of the defined types were placed in this encounter. Health Maintenance   Topic Date Due    DTaP/Tdap/Td series (1 - Tdap) 02/11/1956     ZOSTER VACCINE AGE 60>  12/11/1994    GLAUCOMA SCREENING Q2Y  02/11/2000 sees Dr. Becca Rivera (DEXA)  02/11/2000    Pneumococcal 65+ Low/Medium Risk (1 of 2 - PCV13) 02/11/2000    MEDICARE YEARLY EXAM  02/11/2000 today    INFLUENZA AGE 9 TO ADULT  08/01/2017    COLONOSCOPY  08/29/2023       *Patient verbalized understanding and agreement with the plan. A copy of the After Visit Summary with personalized health plan was given to the patient today.

## 2017-08-07 NOTE — PATIENT INSTRUCTIONS
Schedule of Personalized Health Plan  (Provide Copy to Patient)  The best way to stay healthy is to live a healthy lifestyle. A healthy lifestyle includes regular exercise, eating a well-balanced diet, keeping a healthy weight and not smoking. Regular physical exams and screening tests are another important way to take care of yourself. Preventive exams provided by health care providers can find health problems early when treatment works best and can keep you from getting certain diseases or illnesses. Preventive services include exams, lab tests, screenings, shots, monitoring and information to help you take care of your own health. All people over 65 should have a pneumonia shot. Pneumonia shots are usually only needed once in a lifetime unless your doctor decides differently. All people over 65 should have a yearly flu shot. People over 65 are at medium to high risk for Hepatitis B. Three shots are needed for complete protection. In addition to your physical exam, some screening tests are recommended:    Bone mass measurement (dexa scan) is recommended every two years  Diabetes Mellitus screening is recommended every year. Glaucoma is an eye disease caused by high pressure in the eye. An eye exam is recommended every year. Cardiovascular screening tests that check your cholesterol and other blood fat (lipid) levels are recommended every five years. Colorectal Cancer screening tests help to find pre-cancerous polyps (growths in the colon) so they can be removed before they turn into cancer. Tests ordered for screening depend on your personal and family history risk factors.     Screening for Breast Cancer is recommended yearly with a mammogram.    Screening for Cervical Cancer is recommended every two years (annually for certain risk factors, such as previous history of STD or abnormal PAP in past 7 years), with a Pelvic Exam with PAP    Here is a list of your current Health Maintenance items with a due date:  Health Maintenance   Topic Date Due    DTaP/Tdap/Td series (1 - Tdap) 02/11/1956    ZOSTER VACCINE AGE 60>  12/11/1994    GLAUCOMA SCREENING Q2Y  02/11/2000    OSTEOPOROSIS SCREENING (DEXA)  02/11/2000    Pneumococcal 65+ Low/Medium Risk (1 of 2 - PCV13) 02/11/2000    MEDICARE YEARLY EXAM  02/11/2000    INFLUENZA AGE 9 TO ADULT  08/01/2017    COLONOSCOPY  08/29/2023

## 2017-09-12 RX ORDER — DILTIAZEM HYDROCHLORIDE 300 MG/1
300 CAPSULE, COATED, EXTENDED RELEASE ORAL DAILY
Qty: 30 CAP | Refills: 3 | Status: SHIPPED | OUTPATIENT
Start: 2017-09-12 | End: 2018-02-27 | Stop reason: SDUPTHER

## 2018-01-01 ENCOUNTER — OFFICE VISIT (OUTPATIENT)
Dept: FAMILY MEDICINE CLINIC | Age: 83
End: 2018-01-01

## 2018-01-01 VITALS
DIASTOLIC BLOOD PRESSURE: 80 MMHG | HEART RATE: 83 BPM | OXYGEN SATURATION: 98 % | TEMPERATURE: 96 F | BODY MASS INDEX: 21.17 KG/M2 | RESPIRATION RATE: 16 BRPM | WEIGHT: 124 LBS | HEIGHT: 64 IN | SYSTOLIC BLOOD PRESSURE: 158 MMHG

## 2018-01-01 VITALS
SYSTOLIC BLOOD PRESSURE: 146 MMHG | DIASTOLIC BLOOD PRESSURE: 79 MMHG | TEMPERATURE: 98.4 F | HEART RATE: 82 BPM | RESPIRATION RATE: 16 BRPM | HEIGHT: 64 IN | BODY MASS INDEX: 21.34 KG/M2 | WEIGHT: 125 LBS

## 2018-01-01 DIAGNOSIS — H61.21 IMPACTED CERUMEN, RIGHT EAR: Primary | ICD-10-CM

## 2018-01-01 DIAGNOSIS — Z00.00 MEDICARE ANNUAL WELLNESS VISIT, SUBSEQUENT: Primary | ICD-10-CM

## 2018-01-01 DIAGNOSIS — I10 ESSENTIAL HYPERTENSION: ICD-10-CM

## 2018-01-01 DIAGNOSIS — Z23 ENCOUNTER FOR IMMUNIZATION: ICD-10-CM

## 2018-01-01 DIAGNOSIS — Z78.0 POSTMENOPAUSAL: ICD-10-CM

## 2018-01-01 DIAGNOSIS — L57.0 ACTINIC KERATOSIS: ICD-10-CM

## 2018-01-01 DIAGNOSIS — L57.0 AK (ACTINIC KERATOSIS): ICD-10-CM

## 2018-01-01 DIAGNOSIS — I65.21 CAROTID STENOSIS, RIGHT: ICD-10-CM

## 2018-01-01 RX ORDER — DILTIAZEM HYDROCHLORIDE 300 MG/1
CAPSULE, EXTENDED RELEASE ORAL
Qty: 30 CAP | Refills: 3 | Status: SHIPPED | OUTPATIENT
Start: 2018-01-01 | End: 2019-01-01 | Stop reason: SDUPTHER

## 2018-01-01 RX ORDER — CITALOPRAM 20 MG/1
TABLET, FILM COATED ORAL
Qty: 30 TAB | Refills: 3 | Status: SHIPPED | OUTPATIENT
Start: 2018-01-01

## 2018-02-27 ENCOUNTER — OFFICE VISIT (OUTPATIENT)
Dept: FAMILY MEDICINE CLINIC | Age: 83
End: 2018-02-27

## 2018-02-27 VITALS
TEMPERATURE: 98 F | DIASTOLIC BLOOD PRESSURE: 69 MMHG | HEART RATE: 60 BPM | HEIGHT: 63 IN | RESPIRATION RATE: 16 BRPM | OXYGEN SATURATION: 98 % | SYSTOLIC BLOOD PRESSURE: 154 MMHG | BODY MASS INDEX: 23.04 KG/M2 | WEIGHT: 130 LBS

## 2018-02-27 DIAGNOSIS — L98.9 SKIN LESIONS: ICD-10-CM

## 2018-02-27 DIAGNOSIS — I10 ESSENTIAL HYPERTENSION: ICD-10-CM

## 2018-02-27 DIAGNOSIS — Z01.818 PRE-OP EXAM: Primary | ICD-10-CM

## 2018-02-27 DIAGNOSIS — R79.89 ABNORMAL THYROID BLOOD TEST: ICD-10-CM

## 2018-02-27 RX ORDER — CITALOPRAM 20 MG/1
TABLET, FILM COATED ORAL
Qty: 30 TAB | Refills: 3 | Status: SHIPPED | OUTPATIENT
Start: 2018-02-27 | End: 2018-01-01 | Stop reason: SDUPTHER

## 2018-02-27 RX ORDER — DILTIAZEM HYDROCHLORIDE 300 MG/1
300 CAPSULE, COATED, EXTENDED RELEASE ORAL DAILY
Qty: 30 CAP | Refills: 3 | Status: SHIPPED | OUTPATIENT
Start: 2018-02-27 | End: 2018-01-01 | Stop reason: SDUPTHER

## 2018-02-27 NOTE — PROGRESS NOTES
HISTORY OF PRESENT ILLNESS  Poppy Mckeon is a 80 y.o. female. Chief Complaint   Patient presents with   Lance Max, cataract surgery       HPI  Will have Cataract surgery bilat in March    Walking 2 miles w/o getting out of breath    Taking Celexa 20 prn  Pt states it still helps her    HTN  On Cardizem  Has not run out  No BP check at home    Needs refills of meds    FH CVA - father 59y old    SH never smoked, no ETOH    Would like spots checked    Review of Systems   Constitutional: Negative for chills, fever and weight loss. HENT: Negative for congestion and sore throat. Eyes: Negative for blurred vision and double vision. Respiratory: Negative for cough and shortness of breath. Cardiovascular: Negative for chest pain, palpitations, claudication and leg swelling. Gastrointestinal: Negative for abdominal pain, blood in stool, diarrhea, nausea and vomiting. Genitourinary: Negative for dysuria, frequency and urgency. Musculoskeletal: Negative for joint pain and myalgias. Neurological: Negative for dizziness and headaches. Endo/Heme/Allergies: Does not bruise/bleed easily. Psychiatric/Behavioral: Negative for depression. The patient is not nervous/anxious. Past Medical History:   Diagnosis Date    Hypertension     MVP (mitral valve prolapse)      Past Surgical History:   Procedure Laterality Date    HX COLONOSCOPY      HX PACEMAKER  05/2017       Current Outpatient Prescriptions   Medication Sig Dispense Refill    citalopram (CELEXA) 20 mg tablet TAKE 1 TABLET BY MOUTH ONCE DAILY 30 Tab 3    dilTIAZem CD (CARDIZEM CD) 300 mg ER capsule Take 1 Cap by mouth daily. 30 Cap 3    calcium-cholecalciferol, D3, (CALTRATE 600+D) tablet Take 1 Tab by mouth daily.  cholecalciferol, vitamin D3, (VITAMIN D3) 2,000 unit tab Take  by mouth.  cyanocobalamin (VITAMIN B-12) 500 mcg tablet Take 500 mcg by mouth daily.       vitamin e (AQUA GEMS) 200 unit capsule Take by mouth daily. Allergies   Allergen Reactions    Verapamil Unknown (comments)     Visit Vitals    /69 (BP 1 Location: Right arm, BP Patient Position: Sitting)    Pulse 60    Temp 98 °F (36.7 °C) (Temporal)    Resp 16    Ht 5' 3\" (1.6 m)    Wt 130 lb (59 kg)    SpO2 98%    BMI 23.03 kg/m2     Physical Exam   Constitutional: She is oriented to person, place, and time. She appears well-developed and well-nourished. No distress. HENT:   Head: Normocephalic and atraumatic. Left Ear: External ear normal.   Mouth/Throat: Oropharynx is clear and moist. No oropharyngeal exudate. Right cerumen   Eyes: Conjunctivae and EOM are normal. Pupils are equal, round, and reactive to light. Neck: No thyromegaly present. Cardiovascular: Normal rate, regular rhythm and intact distal pulses. Murmur (in mitral area) heard. Pulmonary/Chest: Effort normal and breath sounds normal.   Abdominal: Soft. She exhibits no distension. There is no tenderness. Musculoskeletal: She exhibits no edema. Lymphadenopathy:     She has no cervical adenopathy. Neurological: She is alert and oriented to person, place, and time. Skin: Skin is warm and dry. Seborrheic keratosis on ant chest, dark macule on root of nose, red lesion sim to [place]   Psychiatric: She has a normal mood and affect. Nursing note and vitals reviewed. EKG paced with dual chamber on every beat  Pt had normal CXR 5/17    ASSESSMENT and PLAN    ICD-10-CM ICD-9-CM    1. Pre-op exam Z01.818 V72.84 CBC WITH AUTOMATED DIFF      METABOLIC PANEL, COMPREHENSIVE      AMB POC EKG ROUTINE W/ 12 LEADS, INTER & REP      HI HANDLG&/OR CONVEY OF SPEC FOR TR OFFICE TO LAB      COLLECTION VENOUS BLOOD,VENIPUNCTURE   2. Abnormal thyroid blood test R94.6 794.5 TSH 3RD GENERATION   3. Skin lesions L98.9 709.9    4.  Essential hypertension I10 401.9 dilTIAZem CD (CARDIZEM CD) 300 mg ER capsule   RTC for Bx or treatment of skin lesions  Pt is stable for eye surgery

## 2018-02-28 LAB
ALBUMIN SERPL-MCNC: 4.2 G/DL (ref 3.5–4.7)
ALBUMIN/GLOB SERPL: 1.5 {RATIO} (ref 1.2–2.2)
ALP SERPL-CCNC: 83 IU/L (ref 39–117)
ALT SERPL-CCNC: 23 IU/L (ref 0–32)
AST SERPL-CCNC: 29 IU/L (ref 0–40)
BASOPHILS # BLD AUTO: 0 X10E3/UL (ref 0–0.2)
BASOPHILS NFR BLD AUTO: 0 %
BILIRUB SERPL-MCNC: 0.3 MG/DL (ref 0–1.2)
BUN SERPL-MCNC: 20 MG/DL (ref 8–27)
BUN/CREAT SERPL: 23 (ref 12–28)
CALCIUM SERPL-MCNC: 9.6 MG/DL (ref 8.7–10.3)
CHLORIDE SERPL-SCNC: 101 MMOL/L (ref 96–106)
CO2 SERPL-SCNC: 24 MMOL/L (ref 18–29)
CREAT SERPL-MCNC: 0.87 MG/DL (ref 0.57–1)
EOSINOPHIL # BLD AUTO: 0.1 X10E3/UL (ref 0–0.4)
EOSINOPHIL NFR BLD AUTO: 2 %
ERYTHROCYTE [DISTWIDTH] IN BLOOD BY AUTOMATED COUNT: 13.8 % (ref 12.3–15.4)
GFR SERPLBLD CREATININE-BSD FMLA CKD-EPI: 62 ML/MIN/1.73
GFR SERPLBLD CREATININE-BSD FMLA CKD-EPI: 71 ML/MIN/1.73
GLOBULIN SER CALC-MCNC: 2.8 G/DL (ref 1.5–4.5)
GLUCOSE SERPL-MCNC: 83 MG/DL (ref 65–99)
HCT VFR BLD AUTO: 39.5 % (ref 34–46.6)
HGB BLD-MCNC: 13.4 G/DL (ref 11.1–15.9)
IMM GRANULOCYTES # BLD: 0 X10E3/UL (ref 0–0.1)
IMM GRANULOCYTES NFR BLD: 0 %
LYMPHOCYTES # BLD AUTO: 2.1 X10E3/UL (ref 0.7–3.1)
LYMPHOCYTES NFR BLD AUTO: 30 %
MCH RBC QN AUTO: 29.8 PG (ref 26.6–33)
MCHC RBC AUTO-ENTMCNC: 33.9 G/DL (ref 31.5–35.7)
MCV RBC AUTO: 88 FL (ref 79–97)
MONOCYTES # BLD AUTO: 0.6 X10E3/UL (ref 0.1–0.9)
MONOCYTES NFR BLD AUTO: 8 %
NEUTROPHILS # BLD AUTO: 4.1 X10E3/UL (ref 1.4–7)
NEUTROPHILS NFR BLD AUTO: 60 %
PLATELET # BLD AUTO: 253 X10E3/UL (ref 150–379)
POTASSIUM SERPL-SCNC: 4.2 MMOL/L (ref 3.5–5.2)
PROT SERPL-MCNC: 7 G/DL (ref 6–8.5)
RBC # BLD AUTO: 4.49 X10E6/UL (ref 3.77–5.28)
SODIUM SERPL-SCNC: 143 MMOL/L (ref 134–144)
TSH SERPL DL<=0.005 MIU/L-ACNC: 4.16 UIU/ML (ref 0.45–4.5)
WBC # BLD AUTO: 6.9 X10E3/UL (ref 3.4–10.8)

## 2018-02-28 NOTE — PROGRESS NOTES
Call pt, the CBC is normal  The sugar, kidney and liver tests are normal  The thyroid test is normal now

## 2018-03-12 PROBLEM — H25.12 AGE-RELATED NUCLEAR CATARACT, LEFT EYE: Chronic | Status: ACTIVE | Noted: 2018-03-12

## 2018-03-14 PROBLEM — H25.12 AGE-RELATED NUCLEAR CATARACT, LEFT EYE: Chronic | Status: RESOLVED | Noted: 2018-03-12 | Resolved: 2018-03-14

## 2018-03-20 PROBLEM — H25.11 AGE-RELATED NUCLEAR CATARACT, RIGHT EYE: Chronic | Status: ACTIVE | Noted: 2018-03-20

## 2018-03-21 PROBLEM — H25.11 AGE-RELATED NUCLEAR CATARACT, RIGHT EYE: Chronic | Status: RESOLVED | Noted: 2018-03-20 | Resolved: 2018-03-21

## 2018-06-08 ENCOUNTER — OFFICE VISIT (OUTPATIENT)
Dept: FAMILY MEDICINE CLINIC | Age: 83
End: 2018-06-08

## 2018-06-08 VITALS
OXYGEN SATURATION: 97 % | WEIGHT: 121 LBS | BODY MASS INDEX: 20.66 KG/M2 | DIASTOLIC BLOOD PRESSURE: 60 MMHG | HEIGHT: 64 IN | HEART RATE: 70 BPM | RESPIRATION RATE: 18 BRPM | SYSTOLIC BLOOD PRESSURE: 135 MMHG | TEMPERATURE: 97.7 F

## 2018-06-08 DIAGNOSIS — J40 BRONCHITIS: Primary | ICD-10-CM

## 2018-06-08 DIAGNOSIS — L98.9 SKIN LESION OF FACE: ICD-10-CM

## 2018-06-08 RX ORDER — AZITHROMYCIN 250 MG/1
TABLET, FILM COATED ORAL
Qty: 6 TAB | Refills: 0 | Status: SHIPPED | OUTPATIENT
Start: 2018-06-08 | End: 2018-06-13

## 2018-06-08 NOTE — PROGRESS NOTES
Chief Complaint   Patient presents with    Cold Symptoms     congestion     1. Have you been to the ER, urgent care clinic since your last visit? Hospitalized since your last visit? No    2. Have you seen or consulted any other health care providers outside of the [address] [place] [address] since your last visit? Include any pap smears or colon screening.  No

## 2018-06-08 NOTE — PROGRESS NOTES
HISTORY OF PRESENT ILLNESS  Verena Metz is a 80 y.o. female. Chief Complaint   Patient presents with    Cold Symptoms     congestion       HPI  Sick for 1.5 weeks  Coughing a lot  Nonproductive  No fever  Tried nebulizer and Mucinex DM    No sick contact    Not smoking    Taking Celexa only once in a while    Has skin lesion on nose and at lip  Pt concerned [facility]  Not seeing Derm    Review of Systems   Constitutional: Negative for fever. HENT: Positive for congestion (clear liquid) and sore throat (little at first). Respiratory: Positive for cough. Negative for sputum production, shortness of breath and wheezing. Cardiovascular: Negative for chest pain. Gastrointestinal: Negative for diarrhea, nausea and vomiting. Past Medical History:   Diagnosis Date    Hypertension     MVP (mitral valve prolapse)        Past Surgical History:   Procedure Laterality Date    HX COLONOSCOPY      HX PACEMAKER  05/2017       Current Outpatient Prescriptions   Medication Sig Dispense Refill    azithromycin (ZITHROMAX) 250 mg tablet Take 2 tablets today, then take 1 tablet daily 6 Tab 0    potassium 99 mg tablet Take 99 mg by mouth daily.  citalopram (CELEXA) 20 mg tablet TAKE 1 TABLET BY MOUTH ONCE DAILY 30 Tab 3    dilTIAZem CD (CARDIZEM CD) 300 mg ER capsule Take 1 Cap by mouth daily. 30 Cap 3    calcium-cholecalciferol, D3, (CALTRATE 600+D) tablet Take 1 Tab by mouth daily.  cholecalciferol, vitamin D3, (VITAMIN D3) 2,000 unit tab Take  by mouth.  cyanocobalamin (VITAMIN B-12) 500 mcg tablet Take 500 mcg by mouth daily.  vitamin e (AQUA GEMS) 200 unit capsule Take  by mouth daily.          Allergies   Allergen Reactions    Verapamil Unknown (comments)       Visit Vitals    /60 (BP 1 Location: Left arm, BP Patient Position: Sitting)    Pulse 70    Temp 97.7 °F (36.5 °C) (Temporal)    Resp 18    Ht 5' 4\" (1.626 m)    Wt 121 lb (54.9 kg)    SpO2 97%    BMI 20.77 kg/m2 Physical Exam   Constitutional: She is oriented to person, place, and time. She appears well-developed and well-nourished. No distress. HENT:   Head: Normocephalic and atraumatic. Left Ear: External ear normal.   Mouth/Throat: Oropharynx is clear and moist. No oropharyngeal exudate. Right Cerumen   Eyes: Conjunctivae and EOM are normal. Pupils are equal, round, and reactive to light. Cardiovascular: Normal rate and regular rhythm. Pulmonary/Chest: Effort normal and breath sounds normal.   Lymphadenopathy:     She has no cervical adenopathy. Neurological: She is alert and oriented to person, place, and time. Skin: Skin is warm and dry. Dark macule over root of nose, 2 inclusion cysts at low lip   Psychiatric: She has a normal mood and affect. Nursing note and vitals reviewed. ASSESSMENT and PLAN    ICD-10-CM ICD-9-CM    1. Bronchitis J40 490 azithromycin (ZITHROMAX) 250 mg tablet  Cont Mucinex DM  RTC if not better or worse   2.  Skin lesion of face L98.9 709.9    Pt advised to set up for skin lesion Bx

## 2018-08-17 NOTE — PATIENT INSTRUCTIONS
Schedule of Personalized Health Plan  (Provide Copy to Patient)  The best way to stay healthy is to live a healthy lifestyle. A healthy lifestyle includes regular exercise, eating a well-balanced diet, keeping a healthy weight and not smoking. Regular physical exams and screening tests are another important way to take care of yourself. Preventive exams provided by health care providers can find health problems early when treatment works best and can keep you from getting certain diseases or illnesses. Preventive services include exams, lab tests, screenings, shots, monitoring and information to help you take care of your own health. All people over 65 should have a pneumonia shot. Pneumonia shots are usually only needed once in a lifetime unless your doctor decides differently. All people over 65 should have a yearly flu shot. People over 65 are at medium to high risk for Hepatitis B. Three shots are needed for complete protection. In addition to your physical exam, some screening tests are recommended:    Bone mass measurement (dexa scan) is recommended every two years  Diabetes Mellitus screening is recommended every year. Glaucoma is an eye disease caused by high pressure in the eye. An eye exam is recommended every year. Cardiovascular screening tests that check your cholesterol and other blood fat (lipid) levels are recommended every five years. Colorectal Cancer screening tests help to find pre-cancerous polyps (growths in the colon) so they can be removed before they turn into cancer. Tests ordered for screening depend on your personal and family history risk factors.     Screening for Breast Cancer is recommended yearly with a mammogram.    Screening for Cervical Cancer is recommended every two years (annually for certain risk factors, such as previous history of STD or abnormal PAP in past 7 years), with a Pelvic Exam with PAP    Here is a list of your current Health Maintenance items with a due date:  Health Maintenance   Topic Date Due    Bone Densitometry (Dexa) Screening  02/11/2000    Influenza Age 5 to Adult  08/01/2018    Pneumococcal 65+ Low/Medium Risk (2 of 2 - PPSV23) 08/07/2018    MEDICARE YEARLY EXAM  08/08/2018    GLAUCOMA SCREENING Q2Y  08/07/2019    COLONOSCOPY  08/29/2023    DTaP/Tdap/Td series (2 - Td) 08/07/2027    ZOSTER VACCINE AGE 60>  Addressed

## 2018-08-17 NOTE — PROGRESS NOTES
Umm Anders is a 80 y.o. female and presents for annual Medicare Wellness Visit. Problem List: Reviewed with patient and discussed risk factors. 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       Current medical providers:  Patient Care Team:  Erna Decker MD as PCP - General (Family Practice)  Yaniv Bach MD (Surgery)    PSH: Reviewed with patient  Past Surgical History:   Procedure Laterality Date    HX COLONOSCOPY      HX PACEMAKER  05/2017        SH: Reviewed with patient  Social History   Substance Use Topics    Smoking status: Never Smoker    Smokeless tobacco: Never Used    Alcohol use No       FH: Reviewed with patient  Family History   Problem Relation Age of Onset    No Known Problems Mother     Stroke Father        Medications/Allergies: Reviewed with patient  Current Outpatient Prescriptions on File Prior to Visit   Medication Sig Dispense Refill    dilTIAZem (TIAZAC) 300 mg SR capsule TAKE 1 CAPSULE BY MOUTH EVERY DAY 30 Cap 3    potassium 99 mg tablet Take 99 mg by mouth daily.  citalopram (CELEXA) 20 mg tablet TAKE 1 TABLET BY MOUTH ONCE DAILY 30 Tab 3    calcium-cholecalciferol, D3, (CALTRATE 600+D) tablet Take 1 Tab by mouth daily.  cholecalciferol, vitamin D3, (VITAMIN D3) 2,000 unit tab Take  by mouth.  cyanocobalamin (VITAMIN B-12) 500 mcg tablet Take 500 mcg by mouth daily.  vitamin e (AQUA GEMS) 200 unit capsule Take  by mouth daily. No current facility-administered medications on file prior to visit. Allergies   Allergen Reactions    Verapamil Unknown (comments)       Objective:  Visit Vitals    Ht 5' 4\" (1.626 m)    Wt 125 lb (56.7 kg)    BMI 21.46 kg/m2    Body mass index is 21.46 kg/(m^2).     Assessment of cognitive impairment: Alert and oriented x 3    Depression Screen:   PHQ over the last two weeks 6/8/2018   Little interest or pleasure in doing things Not at all   Feeling down, depressed, irritable, or hopeless Not at all   Total Score PHQ 2 0       Fall Risk Assessment:    Fall Risk Assessment, last 12 mths 6/8/2018   Able to walk? Yes   Fall in past 12 months? No       Functional Ability:   Does the patient exhibit a steady gait? yes   How long did it take the patient to get up and walk from a sitting position? 2 to 3 seconds   Is the patient self reliant?  (ie can do own laundry, meals, household chores)  yes     Does the patient handle his/her own medications? yes     Does the patient handle his/her own money? yes     Is the patients home safe (ie good lighting, handrails on stairs and bath, etc.)? yes     Did you notice or did patient express any hearing difficulties? no     Did you notice or did patient express any vision difficulties? Yes,wears glasses     Were distance and reading eye charts used? no       Advance Care Planning:   Patient was offered the opportunity to discuss advance care planning:  yes     Does patient have an Advance Directive:  no   If no, did you provide information on Caring Connections? yes       Plan:      No orders of the defined types were placed in this encounter. Health Maintenance   Topic Date Due    Bone Densitometry (Dexa) Screening  02/11/2000    Influenza Age 5 to Adult  08/01/2018    Pneumococcal 65+ Low/Medium Risk (2 of 2 - PPSV23) 08/07/2018    MEDICARE YEARLY EXAM  08/08/2018    GLAUCOMA SCREENING Q2Y  08/07/2019    COLONOSCOPY  08/29/2023    DTaP/Tdap/Td series (2 - Td) 08/07/2027    ZOSTER VACCINE AGE 60>  Addressed       *Patient verbalized understanding and agreement with the plan. A copy of the After Visit Summary with personalized health plan was given to the patient today. 1. Have you been to the ER, urgent care clinic since your last visit? Hospitalized since your last visit? No    2. Have you seen or consulted any other health care providers outside of the [address] [place] [address] since your last visit? Include any pap smears or colon screening.  No
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
## 2018-08-17 NOTE — MR AVS SNAPSHOT
[facility] 6 
[phone] Patient: aMsood Carbajal MRN: [id number] P Visit Information Date & Time Provider Department Dept. Phone Encounter #  
 2018 10:00 AM Rosalina Barkley MD [address] [facility] [id number] Upcoming Health Maintenance Date Due Bone Densitometry (Dexa) Screening 2000 Influenza Age 5 to Adult 2018 Pneumococcal 65+ Low/Medium Risk (2 of 2 - PPSV23) 2018 MEDICARE YEARLY EXAM 2018 GLAUCOMA SCREENING Q2Y 2019 COLONOSCOPY 2023 DTaP/Tdap/Td series (2 - Td) 2027 Allergies as of 2018  Review Complete On: 2018 By: Rosalina Barkley MD  
  
 Severity Noted Reaction Type Reactions Verapamil  2014    Unknown (comments) Current Immunizations  Reviewed on 2017 Name Date Influenza High Dose Vaccine PF 10/31/2016 12:00 AM  
 Pneumococcal Conjugate (PCV-13) 2017 Pneumococcal Polysaccharide (PPSV-23) 2018 Td 2006 12:00 AM  
 Zoster Vaccine, Live 2012 12:00 AM, 2012 12:00 AM  
  
 Not reviewed this visit You Were Diagnosed With   
  
 Codes Comments Medicare annual wellness visit, subsequent    -  Primary ICD-10-CM: Z00.00 ICD-9-CM: V70.0 Postmenopausal     ICD-10-CM: Z78.0 ICD-9-CM: V49.81 Encounter for immunization     ICD-10-CM: Q26 ICD-9-CM: V03.89 Actinic keratosis     ICD-10-CM: L57.0 ICD-9-CM: 702.0 Vitals BP Pulse Temp Resp Height(growth percentile) Weight(growth percentile) 155/85 (BP 1 Location: Right arm, BP Patient Position: Sitting) 82 98.4 °F (36.9 °C) (Oral) 16 5' 4\" (1.626 m) 125 lb (56.7 kg) BMI OB Status Smoking Status 21.46 kg/m2 Postmenopausal Never Smoker Vitals History BMI and BSA Data  Body Mass Index Body Surface Area  
 21.46 kg/m 2 1.6 m 2  
  
  
 Preferred Pharmacy Pharmacy Name Phone [facility] - Kendy Little [phone] Your Updated Medication List  
  
   
This list is accurate as of 8/17/18 10:57 AM.  Always use your most recent med list.  
  
  
  
  
 calcium-cholecalciferol (D3) tablet Commonly known as:  CALTRATE 600+D Take 1 Tab by mouth daily. citalopram 20 mg tablet Commonly known as:  CELEXA  
TAKE 1 TABLET BY MOUTH ONCE DAILY  
  
 dilTIAZem 300 mg SR capsule Commonly known as:  [facility] TAKE 1 CAPSULE BY MOUTH EVERY DAY  
  
 potassium 99 mg tablet Take 99 mg by mouth daily. VITAMIN B-12 500 mcg tablet Generic drug:  cyanocobalamin Take 500 mcg by mouth daily. VITAMIN D3 2,000 unit Tab Generic drug:  cholecalciferol (vitamin D3) Take  by mouth.  
  
 vitamin e 200 unit capsule Commonly known as:  [address] Take  by mouth daily. We Performed the Following DESTRUC PREMALIGNANT, FIRST LESION [71188 CPT(R)] PNEUMOCOCCAL POLYSACCHARIDE VACCINE, 23-VALENT, ADULT OR IMMUNOSUPPRESSED PT DOSE, [74221 CPT(R)] To-Do List   
 08/17/2018 Imaging:  DEXA BONE DENSITY STUDY AXIAL Patient Instructions Schedule of Personalized Health Plan (Provide Copy to Patient) The best way to stay healthy is to live a healthy lifestyle. A healthy lifestyle includes regular exercise, eating a well-balanced diet, keeping a healthy weight and not smoking. Regular physical exams and screening tests are another important way to take care of yourself. Preventive exams provided by health care providers can find health problems early when treatment works best and can keep you from getting certain diseases or illnesses. Preventive services include exams, lab tests, screenings, shots, monitoring and information to help you take care of your own health. All people over 65 should have a pneumonia shot.  Pneumonia shots are usually only needed once in a lifetime unless your doctor decides differently. All people over 65 should have a yearly flu shot. People over 65 are at medium to high risk for Hepatitis B. Three shots are needed for complete protection. In addition to your physical exam, some screening tests are recommended: 
 
Bone mass measurement (dexa scan) is recommended every two years Diabetes Mellitus screening is recommended every year. Glaucoma is an eye disease caused by high pressure in the eye. An eye exam is recommended every year. Cardiovascular screening tests that check your cholesterol and other blood fat (lipid) levels are recommended every five years. Colorectal Cancer screening tests help to find pre-cancerous polyps (growths in the colon) so they can be removed before they turn into cancer. Tests ordered for screening depend on your personal and family history risk factors. Screening for Breast Cancer is recommended yearly with a mammogram. 
 
Screening for Cervical Cancer is recommended every two years (annually for certain risk factors, such as previous history of STD or abnormal PAP in past 7 years), with a Pelvic Exam with PAP Here is a list of your current Health Maintenance items with a due date: 
Health Maintenance Topic Date Due  Bone Densitometry (Dexa) Screening  02/11/2000  Influenza Age 5 to Adult  08/01/2018  Pneumococcal 65+ Low/Medium Risk (2 of 2 - PPSV23) 08/07/2018  MEDICARE YEARLY EXAM  08/08/2018  GLAUCOMA SCREENING Q2Y  08/07/2019  COLONOSCOPY  08/29/2023  DTaP/Tdap/Td series (2 - Td) 08/07/2027  ZOSTER VACCINE AGE 60>  Addressed Introducing [facility]! [address] introduces [facility] patient portal. Now you can access parts of your medical record, email your doctor's office, and request medication refills online. 1. In your internet browser, go to [email or web address] 2. Click on the First Time User? Click Here link in the Sign In box. You will see the New Member Sign Up page. 3. Enter your [employer] Access Code exactly as it appears below. You will not need to use this code after youve completed the sign-up process. If you do not sign up before the expiration date, you must request a new code. · [employer] Access Code: [email or web address] Expires: 9/6/2018  1:06 PM 
 
4. Enter the last four digits of your Social Security Number (xxxx) and Date of Birth (mm/dd/yyyy) as indicated and click Submit. You will be taken to the next sign-up page. 5. Create a [employer] ID. This will be your [employer] login ID and cannot be changed, so think of one that is secure and easy to remember. 6. Create a [employer] password. You can change your password at any time. 7. Enter your Password Reset Question and Answer. This can be used at a later time if you forget your password. 8. Enter your e-mail address. You will receive e-mail notification when new information is available in [address] 9. Click Sign Up. You can now view and download portions of your medical record. 10. Click the Download Summary menu link to download a portable copy of your medical information. If you have questions, please visit the Frequently Asked Questions section of the [employer] website. Remember, [employer] is NOT to be used for urgent needs. For medical emergencies, dial 911. Now available from your iPhone and Android! Please provide this summary of care documentation to your next provider. Your primary care clinician is listed as Brittni Buenrostro. If you have any questions after today's visit, please call [phone].

## 2018-08-17 NOTE — ACP (ADVANCE CARE PLANNING)
Advance Care Planning (ACP) Provider Conversation Snapshot    Date of ACP Conversation: 08/17/18  Persons included in Conversation:  patient  Length of ACP Conversation in minutes:  <16 minutes (Non-Billable)    HO given and pt will review and bring back a copy

## 2018-08-22 NOTE — TELEPHONE ENCOUNTER
Requested Prescriptions     Pending Prescriptions Disp Refills    citalopram (CELEXA) 20 mg tablet 30 Tab 3     Sig: TAKE 1 TABLET BY MOUTH ONCE DAILY    dilTIAZem (TIAZAC) 300 mg SR capsule 30 Cap 3

## 2018-08-24 PROBLEM — I65.21 CAROTID STENOSIS, RIGHT: Status: ACTIVE | Noted: 2018-01-01

## 2018-08-24 NOTE — PROGRESS NOTES
HISTORY OF PRESENT ILLNESS  Greg Groves is a 80 y.o. female. Chief Complaint   Patient presents with    Wax in Ear     R ear, and discuss kidney resx from [facility] screening       HPI  Right ear wax  Tried 4 d of Debrox but nothing is coming out    Had [facility] screening done  Showing mild Carotid stenosis right  Last Chol ok  Not fasting today  Poss Osteoporosis  Has Dexa scheduled in 4 d    Review of Systems   HENT: Positive for hearing loss. Negative for ear discharge and ear pain. Skin:        Red skin lesion on tip of nose     Past Medical History:   Diagnosis Date    Carotid stenosis 2015    mild    Carotid stenosis, right 08/2018    mild    Hypertension     MVP (mitral valve prolapse)        Past Surgical History:   Procedure Laterality Date    HX CATARACT REMOVAL Bilateral     HX COLONOSCOPY      HX PACEMAKER  05/2017       Current Outpatient Prescriptions   Medication Sig Dispense Refill    citalopram (CELEXA) 20 mg tablet TAKE 1 TABLET BY MOUTH ONCE DAILY 30 Tab 3    dilTIAZem (TIAZAC) 300 mg SR capsule TAKE 1 CAPSULE BY MOUTH EVERY DAY 30 Cap 3    potassium 99 mg tablet Take 99 mg by mouth daily.  calcium-cholecalciferol, D3, (CALTRATE 600+D) tablet Take 1 Tab by mouth daily.  cholecalciferol, vitamin D3, (VITAMIN D3) 2,000 unit tab Take  by mouth.  cyanocobalamin (VITAMIN B-12) 500 mcg tablet Take 500 mcg by mouth daily.  vitamin e (AQUA GEMS) 200 unit capsule Take  by mouth daily. Allergies   Allergen Reactions    Verapamil Unknown (comments)       Visit Vitals    /80 (BP 1 Location: Left arm, BP Patient Position: Sitting)    Pulse 83    Temp 96 °F (35.6 °C) (Temporal)    Resp 16    Ht 5' 4\" (1.626 m)    Wt 124 lb (56.2 kg)    SpO2 98%    BMI 21.28 kg/m2     Physical Exam   Constitutional: She is oriented to person, place, and time. She appears well-developed and well-nourished. No distress. HENT:   Head: Normocephalic and atraumatic. Left Ear: External ear normal.   Right Cerumen impaction   Eyes: Conjunctivae and EOM are normal.   Pulmonary/Chest: Effort normal.   Neurological: She is alert and oriented to person, place, and time. Skin:   Rough skin lesion on tip of nose sim to [place]   Psychiatric: She has a normal mood and affect. Nursing note and vitals reviewed. Cerumen removal with Lavage and curette successful, TM intact but mildly irritated    ASSESSMENT and PLAN    ICD-10-CM ICD-9-CM    1. Impacted cerumen, right ear H61.21 380.4 REMOVE IMPACTED EAR WAX   2. Carotid stenosis, right I65.21 433.10 recheck Carotids in 1 y  Check Lipids next apt when fasting   3.  AK (actinic keratosis) L57.0 702.0 RTC for Cryo without make up